# Patient Record
Sex: FEMALE | Race: WHITE | NOT HISPANIC OR LATINO | Employment: FULL TIME | URBAN - METROPOLITAN AREA
[De-identification: names, ages, dates, MRNs, and addresses within clinical notes are randomized per-mention and may not be internally consistent; named-entity substitution may affect disease eponyms.]

---

## 2022-03-28 ENCOUNTER — OFFICE VISIT (OUTPATIENT)
Dept: FAMILY MEDICINE | Facility: CLINIC | Age: 32
End: 2022-03-28
Payer: COMMERCIAL

## 2022-03-28 VITALS
WEIGHT: 115 LBS | OXYGEN SATURATION: 100 % | DIASTOLIC BLOOD PRESSURE: 70 MMHG | TEMPERATURE: 98.8 F | HEART RATE: 70 BPM | RESPIRATION RATE: 16 BRPM | BODY MASS INDEX: 21.16 KG/M2 | HEIGHT: 62 IN | SYSTOLIC BLOOD PRESSURE: 110 MMHG

## 2022-03-28 DIAGNOSIS — R05.9 COUGH: Primary | ICD-10-CM

## 2022-03-28 PROCEDURE — 99203 OFFICE O/P NEW LOW 30 MIN: CPT | Performed by: FAMILY MEDICINE

## 2022-03-28 RX ORDER — PREDNISONE 20 MG/1
TABLET ORAL
Qty: 16 TABLET | Refills: 0 | Status: SHIPPED | OUTPATIENT
Start: 2022-03-28 | End: 2023-04-28

## 2022-03-28 RX ORDER — ALBUTEROL SULFATE 90 UG/1
2 AEROSOL, METERED RESPIRATORY (INHALATION) EVERY 6 HOURS
Qty: 18 G | Refills: 11 | Status: SHIPPED | OUTPATIENT
Start: 2022-03-28 | End: 2023-04-28

## 2022-03-28 RX ORDER — MULTIPLE VITAMINS W/ MINERALS TAB 9MG-400MCG
1 TAB ORAL DAILY
COMMUNITY

## 2022-03-28 ASSESSMENT — PAIN SCALES - GENERAL: PAINLEVEL: NO PAIN (0)

## 2022-03-28 NOTE — PROGRESS NOTES
ASSESSMENT:   (R05.9) Cough  (primary encounter diagnosis)  Comment: frequent respiratory infections.  Lingering cough.  Past history of asthma.    Plan: predniSONE (DELTASONE) 20 MG tablet, albuterol         (PROAIR HFA/PROVENTIL HFA/VENTOLIN HFA) 108 (90        Base) MCG/ACT inhaler          Take prednisone 20mg 2 pills daily for 6 days, then 1 pill daily for another 4 days.   Albuteral inhaler can be used taking 2 inhalations every 4 hours as needed for coughing, wheezing or shortness of breath.     Sometimes there is irritation and inflammation in the airways following a viral upper respiratory infection and the cough can linger for longer than the usual 10 days, sometimes for several weeks.    Increased fluids and humidity in the household, particularly the bedroom can help.   Some of the older antihistamine medications like chlorpheneramine, brompheneramine or clemastine (Tavist) may help for the cough.  Sometimes these are combined with a decongestant like pseudoephedrine.  These over the counter medications are available without a prescription.   Cough suppressant medications like Guaifenesin (Robitussin) with dextromethorphan (DM) can help for cough.  This over the counter medication is available without a prescription.   Codeine with guaifenesin is another option which is prescription and could cause drowsiness but it is good for at night.  Both cough medications are 4 times a day as needed.     Recheck or call with breathing problems, if a fever develops, or getting worse over time.        Ann Berger is a 31 year old who presents for the following health issues   Chief Complaint   Patient presents with     URI      Just moved back to [place] from [place] a year ago.    Was in [place] originally.     Current illness about 2 weeks. Some bronchitis a month ago for 1 and 1/2 weeks a month ago, then better for a week, then ill again.    Had COVID-19 virus in November.     Sinusitis in January.     Had asthma  "in childhood and allergies.   Seemed to grow out of it.    She has had an albuteral inhaler to use off and on.  Has not made much of a difference.     Currently, cough starting to clear.  Some pain left side from cough.  Bothers with turning and tender to touch.   Breathing has been oK.   More cough in AM.   Sinuses seem clear.   No fever in the past couple weeks.    She has had COVID-19 virus vaccine  No booster yet.   nO nasal symptoms at this time.     No chronic illnesses.     Acute Illness  Acute illness concerns: cold symptoms  Onset/Duration: 2 weeks  Symptoms:  Fever: no  Chills/Sweats: no  Headache (location?): no  Sinus Pressure: no  Conjunctivitis:  no  Ear Pain: no  Rhinorrhea: YES prior  Congestion: YES  Sore Throat: no  Cough: YES  Wheeze: YES  Decreased Appetite: no  Nausea: no  Vomiting: no  Diarrhea: no  Dysuria/Freq.: no  Dysuria or Hematuria: no  Fatigue/Achiness: YES  Sick/Strep Exposure:     There is no problem list on file for this patient.     OBJECTIVE:Blood pressure 110/70, pulse 70, temperature 98.8  F (37.1  C), temperature source Tympanic, resp. rate 16, height 1.575 m (5' 2\"), weight 52.2 kg (115 lb), last menstrual period 03/21/2022, SpO2 100 %, not currently breastfeeding. BMI=Body mass index is 21.03 kg/m .  GENERAL APPEARANCE ADULT: Alert, no acute distress  EYES: PERRL, EOM normal, conjunctiva and lids normal  HENT: Ears and TMs normal, oral mucosa and posterior oropharynx normal  NECK: No adenopathy,masses or thyromegaly  RESP: lungs clear to auscultation   CV: normal rate, regular rhythm, no murmur or gallop     Answers for HPI/ROS submitted by the patient on 3/28/2022  How many servings of fruits and vegetables do you eat daily?: 0-1  On average, how many sweetened beverages do you drink each day (Examples: soda, juice, sweet tea, etc.  Do NOT count diet or artificially sweetened beverages)?: 0  How many minutes a day do you exercise enough to make your heart beat faster?: 10 to " 19  How many days a week do you exercise enough to make your heart beat faster?: 3 or less  What is the reason for your visit today?: Re occurring colds  When did your symptoms begin?: 1-2 weeks ago  What are your symptoms?: Cough, side pain  How would you describe these symptoms?: Moderate  Are your symptoms:: Improving  Have you had these symptoms before?: Yes  Have you tried or received treatment for these symptoms before?: Yes  Did that treatment work? : No

## 2022-03-28 NOTE — NURSING NOTE
"Chief Complaint   Patient presents with     URI       Initial /70   Pulse 70   Temp 98.8  F (37.1  C) (Tympanic)   Resp 16   Ht 1.575 m (5' 2\")   Wt 52.2 kg (115 lb)   LMP 03/21/2022   SpO2 100%   Breastfeeding No   BMI 21.03 kg/m   Estimated body mass index is 21.03 kg/m  as calculated from the following:    Height as of this encounter: 1.575 m (5' 2\").    Weight as of this encounter: 52.2 kg (115 lb).    Patient presents to the clinic using     Health Maintenance that is potentially due pending provider review:          Is there anyone who you would like to be able to receive your results?   If yes have patient fill out CALLIE    "

## 2022-03-28 NOTE — PATIENT INSTRUCTIONS
ASSESSMENT:   (R05.9) Cough  (primary encounter diagnosis)  Comment: frequent respiratory infections.  Lingering cough.  Past history of asthma.    Plan: predniSONE (DELTASONE) 20 MG tablet, albuterol         (PROAIR HFA/PROVENTIL HFA/VENTOLIN HFA) 108 (90        Base) MCG/ACT inhaler          Take prednisone 20mg 2 pills daily for 6 days, then 1 pill daily for another 4 days.   Albuteral inhaler can be used taking 2 inhalations every 4 hours as needed for coughing, wheezing or shortness of breath.     Sometimes there is irritation and inflammation in the airways following a viral upper respiratory infection and the cough can linger for longer than the usual 10 days, sometimes for several weeks.    Increased fluids and humidity in the household, particularly the bedroom can help.   Some of the older antihistamine medications like chlorpheneramine, brompheneramine or clemastine (Tavist) may help for the cough.  Sometimes these are combined with a decongestant like pseudoephedrine.  These over the counter medications are available without a prescription.   Cough suppressant medications like Guaifenesin (Robitussin) with dextromethorphan (DM) can help for cough.  This over the counter medication is available without a prescription.   Codeine with guaifenesin is another option which is prescription and could cause drowsiness but it is good for at night.  Both cough medications are 4 times a day as needed.     Recheck or call with breathing problems, if a fever develops, or getting worse over time.

## 2022-04-03 ENCOUNTER — HEALTH MAINTENANCE LETTER (OUTPATIENT)
Age: 32
End: 2022-04-03

## 2022-06-13 ENCOUNTER — TELEPHONE (OUTPATIENT)
Dept: FAMILY MEDICINE | Facility: CLINIC | Age: 32
End: 2022-06-13
Payer: COMMERCIAL

## 2022-06-13 NOTE — TELEPHONE ENCOUNTER
Patient Quality Outreach    Patient is due for the following:   Cervical Cancer Screening - PAP Needed  Physical  - overdue  Immunizations  -  Tdap    NEXT STEPS:   Schedule a yearly physical    Type of outreach:    Sent [employer] message.      Questions for provider review:    None     Celia Donnelly

## 2022-07-29 ENCOUNTER — OFFICE VISIT (OUTPATIENT)
Dept: OBGYN | Facility: CLINIC | Age: 32
End: 2022-07-29
Payer: COMMERCIAL

## 2022-07-29 VITALS
WEIGHT: 121.4 LBS | RESPIRATION RATE: 12 BRPM | TEMPERATURE: 98.1 F | DIASTOLIC BLOOD PRESSURE: 61 MMHG | HEART RATE: 66 BPM | HEIGHT: 62 IN | BODY MASS INDEX: 22.34 KG/M2 | SYSTOLIC BLOOD PRESSURE: 99 MMHG

## 2022-07-29 DIAGNOSIS — N92.0 MENORRHAGIA WITH REGULAR CYCLE: ICD-10-CM

## 2022-07-29 DIAGNOSIS — L70.9 ACNE, UNSPECIFIED ACNE TYPE: Primary | ICD-10-CM

## 2022-07-29 PROCEDURE — 99203 OFFICE O/P NEW LOW 30 MIN: CPT | Performed by: OBSTETRICS & GYNECOLOGY

## 2022-07-29 RX ORDER — CLINDAMYCIN PHOSPHATE 10 UG/ML
LOTION TOPICAL
COMMUNITY
Start: 2022-06-16

## 2022-07-29 RX ORDER — TRETINOIN 0.25 MG/G
CREAM TOPICAL
COMMUNITY
Start: 2022-06-16

## 2022-07-29 RX ORDER — ETONOGESTREL AND ETHINYL ESTRADIOL VAGINAL RING .015; .12 MG/D; MG/D
1 RING VAGINAL
Qty: 3 EACH | Refills: 3 | Status: SHIPPED | OUTPATIENT
Start: 2022-07-29 | End: 2023-04-28

## 2022-07-29 NOTE — PROGRESS NOTES
[facility]    Gynecology Office Note    CC:   Chief Complaint   Patient presents with     Consult        HPI: Celine Potts is a 32 year old  who presents with concern for hormone imbalance. Patient reports having changing hormonal symptoms since 2021. She had her last child 2020 via C section with tubal ligation. No menses until 2021. Since then, notes heavy menstrual bleeding. Menses are regular in interval and last 4 days. First few days are subjectively heavy, using a super tampon every 2 hours. Having associated cramping. Has also noticed an increase in facial acne and some thickening of the hair on her face (not dark). She has recently starting seeing dermatology for the acne and notes some improvement.     GYN Hx:     Patient's last menstrual period was 2022 (exact date).    Cycle: regular  Duration: 4 days  Dysmenorrhea: yes, cramping  Contraception: tubal ligation     Was on OCPs for many years    ROS: A 10 pt ROS was completed and found to be otherwise negative unless mentioned in the HPI.     PMH:   History reviewed. No pertinent past medical history.    PSHx:   Past Surgical History:   Procedure Laterality Date      SECTION Right 2020    with tubal ligation       OBHx:       Medications:   clindamycin (CLEOCIN T) 1 % external lotion,   multivitamin w/minerals (THERA-VIT-M) tablet, Take 1 tablet by mouth daily  tretinoin (RETIN-A) 0.025 % external cream,   albuterol (PROAIR HFA/PROVENTIL HFA/VENTOLIN HFA) 108 (90 Base) MCG/ACT inhaler, Inhale 2 puffs into the lungs every 6 hours (Patient not taking: Reported on 2022)  predniSONE (DELTASONE) 20 MG tablet, Take prednisone 20mg 2 pills daily for 6 days, then 1 pill daily for another 4 days. (Patient not taking: Reported on 2022)    No current facility-administered medications on file prior to visit.      Allergies:    No Known Allergies    Social History:   Social History  "    Socioeconomic History     Marital status:      Spouse name: Not on file     Number of children: Not on file     Years of education: Not on file     Highest education level: Not on file   Occupational History     Not on file   Tobacco Use     Smoking status: Never Smoker     Smokeless tobacco: Never Used   Substance and Sexual Activity     Alcohol use: Not on file     Drug use: Not on file     Sexual activity: Yes     Partners: Male   Other Topics Concern     Not on file   Social History Narrative     Not on file     Social Determinants of Health     Financial Resource Strain: Not on file   Food Insecurity: Not on file   Transportation Needs: Not on file   Physical Activity: Not on file   Stress: Not on file   Social Connections: Not on file   Intimate Partner Violence: Not on file   Housing Stability: Not on file         Family History:   History reviewed. No pertinent family history.    Physical Exam:   Vitals:    07/29/22 0939   BP: 99/61   BP Location: Right arm   Patient Position: Sitting   Cuff Size: Adult Regular   Pulse: 66   Resp: 12   Temp: 98.1  F (36.7  C)   TempSrc: Tympanic   Weight: 55.1 kg (121 lb 6.4 oz)   Height: 1.575 m (5' 2\")      Estimated body mass index is 22.2 kg/m  as calculated from the following:    Height as of this encounter: 1.575 m (5' 2\").    Weight as of this encounter: 55.1 kg (121 lb 6.4 oz).    General appearance: well-hydrated, A&O x 3, no apparent distress  Lungs: Equal expansion bilaterally, no accessory muscle use  Heart: No heaves or thrills.   Constitutional: See vitals  Neuro: CN II-XII grossly intact      Assessment and Plan:     Encounter Diagnoses   Name Primary?     Acne, unspecified acne type Yes     Menorrhagia with regular cycle      Patient with hormonal concerns today, wondering if she needs her hormones tested. Discussed difficulty with hormone testing as they fluctuate so frequently. Discussed possible treatment options for her bothersome symptoms. " Does report her acne regimen from dermatology has shown some improvement. Discussed Spironolactone if needed to help with acne and hair growth. Discussed hormone therapies for additional regulation of this as well as menstrual cycle. She has been on OCPs for years prior to having children and doesn't want to take a pill everyday. After discussion of alternative options, would like to try Nuvaring, Rx sent.    Health maintenance: Due for annual exam and pap smear. Plan 2-3 month follow up for this with recheck of symptoms.     Chelsie Wang DO

## 2022-07-29 NOTE — NURSING NOTE
"Initial BP 99/61 (BP Location: Right arm, Patient Position: Sitting, Cuff Size: Adult Regular)   Pulse 66   Temp 98.1  F (36.7  C) (Tympanic)   Resp 12   Ht 1.575 m (5' 2\")   Wt 55.1 kg (121 lb 6.4 oz)   LMP 07/06/2022 (Exact Date)   BMI 22.20 kg/m   Estimated body mass index is 22.2 kg/m  as calculated from the following:    Height as of this encounter: 1.575 m (5' 2\").    Weight as of this encounter: 55.1 kg (121 lb 6.4 oz). .      "

## 2022-10-03 ENCOUNTER — HEALTH MAINTENANCE LETTER (OUTPATIENT)
Age: 32
End: 2022-10-03

## 2023-03-20 ENCOUNTER — TELEPHONE (OUTPATIENT)
Dept: FAMILY MEDICINE | Facility: CLINIC | Age: 33
End: 2023-03-20
Payer: COMMERCIAL

## 2023-03-20 NOTE — TELEPHONE ENCOUNTER
Patient Quality Outreach    Patient is due for the following:   Physical Preventive Adult Physical    Next Steps:   Physical    Type of outreach:    Sent [employer] message.      Questions for provider review:    None     Ada Fernandez CMA

## 2023-04-05 ENCOUNTER — TELEPHONE (OUTPATIENT)
Dept: OBGYN | Facility: CLINIC | Age: 33
End: 2023-04-05
Payer: COMMERCIAL

## 2023-04-05 NOTE — TELEPHONE ENCOUNTER
Panel Management Review    Health Maintenance List    Health Maintenance   Topic Date Due     YEARLY PREVENTIVE VISIT  Never done     ADVANCE CARE PLANNING  Never done     COVID-19 Vaccine (1) Never done     HIV SCREENING  Never done     HEPATITIS C SCREENING  Never done     PAP  Never done     DTAP/TDAP/TD IMMUNIZATION (3 - Td or Tdap) 2016     INFLUENZA VACCINE (1) 2022     PHQ-2 (once per calendar year)  2023     MENINGITIS IMMUNIZATION  Completed     HEPATITIS B IMMUNIZATION  Completed     Pneumococcal Vaccine: Pediatrics (0 to 5 Years) and At-Risk Patients (6 to 64 Years)  Aged Out     IPV IMMUNIZATION  Aged Out       Composite cancer screening  Chart review shows that this patient is due/due soon for the following Pap Smear  No results found for: PAP  Past Surgical History:   Procedure Laterality Date      SECTION Right 2020    with tubal ligation       Is hysterectomy listed in surgical history? No   Is mastectomy listed in surgical history? No     Summary:    Patient is due/failing the following:   Pap Smear    Action needed: Patient needs office visit for Pap smear.    Type of outreach:  Sent [employer] message.      Staff Signature:  Idris SAM

## 2023-04-08 ENCOUNTER — APPOINTMENT (OUTPATIENT)
Dept: MRI IMAGING | Facility: CLINIC | Age: 33
End: 2023-04-08
Attending: FAMILY MEDICINE
Payer: COMMERCIAL

## 2023-04-08 ENCOUNTER — NURSE TRIAGE (OUTPATIENT)
Dept: NURSING | Facility: CLINIC | Age: 33
End: 2023-04-08
Payer: COMMERCIAL

## 2023-04-08 ENCOUNTER — HOSPITAL ENCOUNTER (EMERGENCY)
Facility: CLINIC | Age: 33
Discharge: HOME | End: 2023-04-08
Attending: FAMILY MEDICINE | Admitting: FAMILY MEDICINE
Payer: COMMERCIAL

## 2023-04-08 VITALS
HEART RATE: 94 BPM | SYSTOLIC BLOOD PRESSURE: 112 MMHG | RESPIRATION RATE: 18 BRPM | WEIGHT: 120 LBS | TEMPERATURE: 98 F | HEIGHT: 62 IN | OXYGEN SATURATION: 100 % | BODY MASS INDEX: 22.08 KG/M2 | DIASTOLIC BLOOD PRESSURE: 63 MMHG

## 2023-04-08 DIAGNOSIS — R11.2 NAUSEA AND VOMITING, UNSPECIFIED VOMITING TYPE: ICD-10-CM

## 2023-04-08 DIAGNOSIS — R42 DIZZINESS: ICD-10-CM

## 2023-04-08 LAB
ANION GAP SERPL CALCULATED.3IONS-SCNC: 12 MMOL/L (ref 7–15)
BASOPHILS # BLD AUTO: 0 10E3/UL (ref 0–0.2)
BASOPHILS NFR BLD AUTO: 0 %
BUN SERPL-MCNC: 9.2 MG/DL (ref 6–20)
CALCIUM SERPL-MCNC: 9.6 MG/DL (ref 8.6–10)
CHLORIDE SERPL-SCNC: 101 MMOL/L (ref 98–107)
CREAT SERPL-MCNC: 0.71 MG/DL (ref 0.51–0.95)
DEPRECATED HCO3 PLAS-SCNC: 22 MMOL/L (ref 22–29)
EOSINOPHIL # BLD AUTO: 0 10E3/UL (ref 0–0.7)
EOSINOPHIL NFR BLD AUTO: 0 %
ERYTHROCYTE [DISTWIDTH] IN BLOOD BY AUTOMATED COUNT: 11.8 % (ref 10–15)
GFR SERPL CREATININE-BSD FRML MDRD: >90 ML/MIN/1.73M2
GLUCOSE SERPL-MCNC: 94 MG/DL (ref 70–99)
HCT VFR BLD AUTO: 43.1 % (ref 35–47)
HGB BLD-MCNC: 14.8 G/DL (ref 11.7–15.7)
HOLD SPECIMEN: NORMAL
IMM GRANULOCYTES # BLD: 0 10E3/UL
IMM GRANULOCYTES NFR BLD: 0 %
LYMPHOCYTES # BLD AUTO: 1.2 10E3/UL (ref 0.8–5.3)
LYMPHOCYTES NFR BLD AUTO: 24 %
MCH RBC QN AUTO: 30.7 PG (ref 26.5–33)
MCHC RBC AUTO-ENTMCNC: 34.3 G/DL (ref 31.5–36.5)
MCV RBC AUTO: 89 FL (ref 78–100)
MONOCYTES # BLD AUTO: 0.3 10E3/UL (ref 0–1.3)
MONOCYTES NFR BLD AUTO: 6 %
NEUTROPHILS # BLD AUTO: 3.5 10E3/UL (ref 1.6–8.3)
NEUTROPHILS NFR BLD AUTO: 70 %
NRBC # BLD AUTO: 0 10E3/UL
NRBC BLD AUTO-RTO: 0 /100
PLATELET # BLD AUTO: 207 10E3/UL (ref 150–450)
POTASSIUM SERPL-SCNC: 4.1 MMOL/L (ref 3.4–5.3)
RBC # BLD AUTO: 4.82 10E6/UL (ref 3.8–5.2)
SODIUM SERPL-SCNC: 135 MMOL/L (ref 136–145)
WBC # BLD AUTO: 5 10E3/UL (ref 4–11)

## 2023-04-08 PROCEDURE — 258N000003 HC RX IP 258 OP 636: Performed by: FAMILY MEDICINE

## 2023-04-08 PROCEDURE — 96374 THER/PROPH/DIAG INJ IV PUSH: CPT | Mod: 59 | Performed by: FAMILY MEDICINE

## 2023-04-08 PROCEDURE — 250N000011 HC RX IP 250 OP 636: Performed by: FAMILY MEDICINE

## 2023-04-08 PROCEDURE — 85025 COMPLETE CBC W/AUTO DIFF WBC: CPT | Performed by: FAMILY MEDICINE

## 2023-04-08 PROCEDURE — 255N000002 HC RX 255 OP 636: Performed by: FAMILY MEDICINE

## 2023-04-08 PROCEDURE — 99284 EMERGENCY DEPT VISIT MOD MDM: CPT | Mod: 25 | Performed by: FAMILY MEDICINE

## 2023-04-08 PROCEDURE — 99285 EMERGENCY DEPT VISIT HI MDM: CPT | Mod: 25 | Performed by: FAMILY MEDICINE

## 2023-04-08 PROCEDURE — 96361 HYDRATE IV INFUSION ADD-ON: CPT | Performed by: FAMILY MEDICINE

## 2023-04-08 PROCEDURE — 96375 TX/PRO/DX INJ NEW DRUG ADDON: CPT | Mod: 59 | Performed by: FAMILY MEDICINE

## 2023-04-08 PROCEDURE — 70553 MRI BRAIN STEM W/O & W/DYE: CPT

## 2023-04-08 PROCEDURE — 80048 BASIC METABOLIC PNL TOTAL CA: CPT | Performed by: FAMILY MEDICINE

## 2023-04-08 PROCEDURE — 36415 COLL VENOUS BLD VENIPUNCTURE: CPT | Performed by: FAMILY MEDICINE

## 2023-04-08 PROCEDURE — 93005 ELECTROCARDIOGRAM TRACING: CPT | Performed by: FAMILY MEDICINE

## 2023-04-08 PROCEDURE — A9585 GADOBUTROL INJECTION: HCPCS | Performed by: FAMILY MEDICINE

## 2023-04-08 PROCEDURE — 250N000013 HC RX MED GY IP 250 OP 250 PS 637: Performed by: FAMILY MEDICINE

## 2023-04-08 PROCEDURE — 93010 ELECTROCARDIOGRAM REPORT: CPT | Performed by: FAMILY MEDICINE

## 2023-04-08 RX ORDER — ONDANSETRON 2 MG/ML
4 INJECTION INTRAMUSCULAR; INTRAVENOUS ONCE
Status: DISCONTINUED | OUTPATIENT
Start: 2023-04-08 | End: 2023-04-08 | Stop reason: HOSPADM

## 2023-04-08 RX ORDER — LORAZEPAM 2 MG/ML
1 INJECTION INTRAMUSCULAR ONCE
Status: COMPLETED | OUTPATIENT
Start: 2023-04-08 | End: 2023-04-08

## 2023-04-08 RX ORDER — PROMETHAZINE HYDROCHLORIDE 25 MG/1
25 TABLET ORAL ONCE
Status: COMPLETED | OUTPATIENT
Start: 2023-04-08 | End: 2023-04-08

## 2023-04-08 RX ORDER — PROMETHAZINE HYDROCHLORIDE 25 MG/1
25 TABLET ORAL EVERY 6 HOURS PRN
Qty: 16 TABLET | Refills: 0 | Status: SHIPPED | OUTPATIENT
Start: 2023-04-08 | End: 2023-04-28

## 2023-04-08 RX ORDER — MECLIZINE HYDROCHLORIDE 25 MG/1
25 TABLET ORAL 3 TIMES DAILY PRN
Qty: 12 TABLET | Refills: 0 | Status: SHIPPED | OUTPATIENT
Start: 2023-04-08 | End: 2023-04-28

## 2023-04-08 RX ORDER — SODIUM CHLORIDE 9 MG/ML
1000 INJECTION, SOLUTION INTRAVENOUS CONTINUOUS
Status: DISCONTINUED | OUTPATIENT
Start: 2023-04-08 | End: 2023-04-08 | Stop reason: HOSPADM

## 2023-04-08 RX ORDER — ONDANSETRON 4 MG/1
4 TABLET, ORALLY DISINTEGRATING ORAL ONCE
Status: COMPLETED | OUTPATIENT
Start: 2023-04-08 | End: 2023-04-08

## 2023-04-08 RX ORDER — ONDANSETRON 4 MG/1
4-8 TABLET, ORALLY DISINTEGRATING ORAL EVERY 6 HOURS PRN
Qty: 10 TABLET | Refills: 0 | Status: SHIPPED | OUTPATIENT
Start: 2023-04-08

## 2023-04-08 RX ORDER — GADOBUTROL 604.72 MG/ML
5 INJECTION INTRAVENOUS ONCE
Status: COMPLETED | OUTPATIENT
Start: 2023-04-08 | End: 2023-04-08

## 2023-04-08 RX ORDER — ONDANSETRON 2 MG/ML
4 INJECTION INTRAMUSCULAR; INTRAVENOUS
Status: COMPLETED | OUTPATIENT
Start: 2023-04-08 | End: 2023-04-08

## 2023-04-08 RX ORDER — MECLIZINE HYDROCHLORIDE 25 MG/1
25 TABLET ORAL ONCE
Status: COMPLETED | OUTPATIENT
Start: 2023-04-08 | End: 2023-04-08

## 2023-04-08 RX ADMIN — LORAZEPAM 1 MG: 2 INJECTION INTRAMUSCULAR; INTRAVENOUS at 13:44

## 2023-04-08 RX ADMIN — MECLIZINE HYDROCHLORIDE 25 MG: 25 TABLET ORAL at 18:28

## 2023-04-08 RX ADMIN — SODIUM CHLORIDE 1000 ML: 9 INJECTION, SOLUTION INTRAVENOUS at 13:43

## 2023-04-08 RX ADMIN — PROMETHAZINE HYDROCHLORIDE 25 MG: 25 TABLET ORAL at 18:29

## 2023-04-08 RX ADMIN — ONDANSETRON 4 MG: 2 INJECTION INTRAMUSCULAR; INTRAVENOUS at 13:22

## 2023-04-08 RX ADMIN — ONDANSETRON 4 MG: 4 TABLET, ORALLY DISINTEGRATING ORAL at 18:28

## 2023-04-08 RX ADMIN — GADOBUTROL 5 ML: 604.72 INJECTION INTRAVENOUS at 15:44

## 2023-04-08 ASSESSMENT — ENCOUNTER SYMPTOMS
WHEEZING: 0
FREQUENCY: 0
DYSURIA: 0
DIARRHEA: 0
NAUSEA: 1
COUGH: 0
SORE THROAT: 0
CHILLS: 0
FEVER: 0
PALPITATIONS: 0
SINUS PRESSURE: 0
BLOOD IN STOOL: 0
HEADACHES: 0
CONSTIPATION: 0
DIAPHORESIS: 0
VOMITING: 1
ABDOMINAL PAIN: 0
DIZZINESS: 1
SHORTNESS OF BREATH: 0

## 2023-04-08 ASSESSMENT — ACTIVITIES OF DAILY LIVING (ADL)
ADLS_ACUITY_SCORE: 35
ADLS_ACUITY_SCORE: 35
ADLS_ACUITY_SCORE: 33
ADLS_ACUITY_SCORE: 35

## 2023-04-08 NOTE — ED TRIAGE NOTES
Vertigo since Thursday.  Pt went to work on Friday and then became dizzy again.   Pt has been vomiting since Friday night, hot/cold sweats.   Pt denies numbness, tingling.       Triage Assessment     Row Name 04/08/23 1241       Triage Assessment (Adult)    Airway WDL WDL       Respiratory WDL    Respiratory WDL WDL

## 2023-04-08 NOTE — ED NOTES
Pt feeling dizzy since Thursday, nausea with emesis.  Denies diarrhea today, but states GI illness last week.  Per MD, patient ataxic.  MRI checklist completed, faxed, pt in MRI gown.  IVF infusing.  Family at bedside.

## 2023-04-08 NOTE — TELEPHONE ENCOUNTER
"Pt calling with concerns about;    Vertigo symptoms began on or about 4/6/23  Dizziness with vomiting  Constant spinning   Unable to stand without support  Unable to keep food down    Pt Denies;  Fever  Diarrhea    According to the protocol, patient should go to ED - no PCP for 2LT.  Care advice given. Patient verbalizes understanding and agrees with plan of care.     Sheila Tavares RN, Nurse Advisor 10:03 AM 4/8/2023  Reason for Disposition    SEVERE dizziness (e.g., unable to stand, requires support to walk, feels like passing out now)    Additional Information    Negative: SEVERE difficulty breathing (e.g., struggling for each breath, speaks in single words)    Negative: [1] Difficulty breathing or swallowing AND [2] started suddenly after medicine, an allergic food or bee sting    Negative: Shock suspected (e.g., cold/pale/clammy skin, too weak to stand, low BP, rapid pulse)    Negative: Difficult to awaken or acting confused (e.g., disoriented, slurred speech)    Negative: [1] Weakness (i.e., paralysis, loss of muscle strength) of the face, arm or leg on one side of the body AND [2] sudden onset AND [3] present now    Negative: [1] Numbness (i.e., loss of sensation) of the face, arm or leg on one side of the body AND [2] sudden onset AND [3] present now    Negative: [1] Loss of speech or garbled speech AND [2] sudden onset AND [3] present now    Negative: Overdose (accidental or intentional) of medications    Negative: [1] Fainted > 15 minutes ago AND [2] still feels too weak or dizzy to stand    Negative: Heart beating < 50 beats per minute OR > 140 beats per minute    Negative: Sounds like a life-threatening emergency to the triager    Negative: Chest pain    Negative: Rectal bleeding, bloody stool, or tarry-black stool    Negative: [1] Vomiting AND [2] contains red blood or black (\"coffee ground\") material    Negative: Vomiting is main symptom    Negative: Diarrhea is main symptom    Negative: Headache is " main symptom    Negative: Patient states that they are having an anxiety or panic attack    Negative: Dizziness from low blood sugar (i.e., < 60 mg/dl or 3.5 mmol/l)    Negative: Dizziness is described as a spinning sensation (i.e., vertigo)    Negative: Heat exhaustion suspected (i.e., dehydration from heat exposure)    Negative: Difficulty breathing    Protocols used: DIZZINESS - [id number]

## 2023-04-08 NOTE — ED PROVIDER NOTES
Emergency Department Patient Sign-out     ED Visit Duration at the Time of Chart Review:  5 hrs 41 min.    ED Summary and Plan at Sign Out:  Patient is thought to have peripheral vertigo.  MRI was ordered and is unremarkable.  Patient was scheduled to be discharged home at this point.    ED MDM:  1818.  Unfortunately, the patient continues to be nauseous and looks quite uncomfortable.  The IV has been removed.  She is holding her emesis bag.  I will provide Phenergan and meclizine by mouth.  She has already had 8 mg of Zofran, a liter of normal saline, and Ativan.  Low concern for central cause of symptoms.  Other viral illness?  She did have some mildly loose stool this morning.  Other family members with stomach related issues earlier in the week.    1919.  Patient improved with medications given.  Referral order for ENT as needed.  Prescriptions for Phenergan, meclizine, and Zofran.  Viral versus peripheral vertigo.    IMPRESSION:    ICD-10-CM    1. Acute vestibular syndrome  H81.90     I do suspect this is ultimately triggered vertigo due to BPPV and would recommend home epley maneuver (see [employer] online Youtube videos),  as needed antivert or dramamine for the first 3 days only.              Kahlil Noriega MD  04/08/23 1919

## 2023-04-08 NOTE — ED PROVIDER NOTES
History     Chief Complaint   Patient presents with     Dizziness     HPI  Celine Potts is a 32 year old female who presents with vertigo that had onset on Thursday in the afternoon.  It appeared to be worse with head turning but was not clearly only with this.  There may have been a continuous vertigo present.  There was nausea vomiting without diarrhea.  She had no ear pain sinus pressure sore throat.  No tinnitus.  No prior vertigo.  History of a more distant severe migraine that was associate with atypical features speech changes that prompted a MRI that was negative.  Is on NuvaRing now.  She denies any head injury.  She has had a prior tubal ligation and the NuvaRing is for menorrhagia.  She has no associated weakness focally.  No change in speech or swallowing.    Allergies:  No Known Allergies    Problem List:    There are no problems to display for this patient.       Past Medical History:    No past medical history on file.    Past Surgical History:    Past Surgical History:   Procedure Laterality Date      SECTION Right 2020    with tubal ligation       Family History:    No family history on file.    Social History:  Marital Status:   [2]  Social History     Tobacco Use     Smoking status: Never     Smokeless tobacco: Never        Medications:    albuterol (PROAIR HFA/PROVENTIL HFA/VENTOLIN HFA) 108 (90 Base) MCG/ACT inhaler  clindamycin (CLEOCIN T) 1 % external lotion  etonogestrel-ethinyl estradiol (NUVARING) 0.12-0.015 MG/24HR vaginal ring  multivitamin w/minerals (THERA-VIT-M) tablet  predniSONE (DELTASONE) 20 MG tablet  tretinoin (RETIN-A) 0.025 % external cream          Review of Systems   Constitutional: Negative for chills, diaphoresis and fever.   HENT: Negative for ear pain, sinus pressure and sore throat.    Eyes: Negative for visual disturbance.   Respiratory: Negative for cough, shortness of breath and wheezing.    Cardiovascular: Negative for chest pain and  "palpitations.   Gastrointestinal: Positive for nausea and vomiting. Negative for abdominal pain, blood in stool, constipation and diarrhea.   Genitourinary: Negative for dysuria, frequency and urgency.   Skin: Negative for rash.   Neurological: Positive for dizziness. Negative for headaches.   All other systems reviewed and are negative.      Physical Exam   BP: 102/71  Pulse: 95  Temp: 98  F (36.7  C)  Resp: 18  Height: 157.5 cm (5' 2\")  Weight: 54.4 kg (120 lb)  SpO2: 99 %      Physical Exam  Constitutional:       General: She is in acute distress.      Appearance: She is not diaphoretic.   HENT:      Head: Atraumatic.      Right Ear: Tympanic membrane normal.      Left Ear: Tympanic membrane normal.   Eyes:      Extraocular Movements: Extraocular movements intact.      Right eye: Nystagmus present.      Left eye: Nystagmus present.      Conjunctiva/sclera: Conjunctivae normal.      Pupils: Pupils are equal, round, and reactive to light.   Cardiovascular:      Rate and Rhythm: Normal rate and regular rhythm.      Heart sounds: No murmur heard.  Pulmonary:      Effort: Pulmonary effort is normal. No respiratory distress.      Breath sounds: Normal breath sounds. No stridor. No wheezing or rhonchi.   Abdominal:      General: Abdomen is flat. There is no distension.      Palpations: Abdomen is soft. There is no mass.      Tenderness: There is no abdominal tenderness. There is no guarding.   Musculoskeletal:      Cervical back: Neck supple.      Right lower leg: No edema.      Left lower leg: No edema.   Skin:     Coloration: Skin is not pale.      Findings: No rash.   Neurological:      General: No focal deficit present.      Mental Status: She is alert and oriented to person, place, and time.      GCS: GCS eye subscore is 4. GCS verbal subscore is 5. GCS motor subscore is 6.      Cranial Nerves: No cranial nerve deficit, dysarthria or facial asymmetry.      Sensory: No sensory deficit.      Motor: No weakness or " tremor.      Coordination: Romberg sign negative. Coordination normal. Finger-Nose-Finger Test normal. Rapid alternating movements normal.      Gait: Gait abnormal.     Nystagmus appears to be most prominent when looking to the right side I said to a lesser extent to the left side.  There is also increased vertigo that appears to be worse when she turns her head to the left side.  The nystagmus was not continuous and therefore difficult to perform hints exam.  I did ambulate the patient.  And she had ataxia with ambulation and had some difficulty with Romberg as well.          ED Course                 Procedures                EKG Interpretation:      Interpreted by Shaw Whyte MD  EKG done at 1455 hrs. demonstrates a sinus rhythm 91 bpm with a normal axis.  No ST change.  No T wave changes.  Normal R progression and no Q waves.  Normal intervals.  Normal conduction.  No ectopy.  Impression sinus rhythm 91 bpm with no significant acute changes.    Critical Care time:  none               Results for orders placed or performed during the hospital encounter of 04/08/23 (from the past 24 hour(s))   [place] Draw    Narrative    The following orders were created for panel order [place] Draw.  Procedure                               Abnormality         Status                     ---------                               -----------         ------                     Extra Red Top Tube[[id number]]                               Final result               Extra Green Top (Lithium...[[id number]]                      Final result               Extra Purple Top Tube[[id number]]                            Final result                 Please view results for these tests on the individual orders.   Extra Red Top Tube   Result Value Ref Range    Hold Specimen JIC    Extra Green Top (Lithium Heparin) Tube   Result Value Ref Range    Hold Specimen JIC    Extra Purple Top Tube   Result Value Ref Range    Hold Specimen JIC    CBC with  platelets differential    Narrative    The following orders were created for panel order CBC with platelets differential.  Procedure                               Abnormality         Status                     ---------                               -----------         ------                     CBC with platelets and d...[[id number]]                      Final result                 Please view results for these tests on the individual orders.   Basic metabolic panel   Result Value Ref Range    Sodium 135 (L) 136 - 145 mmol/L    Potassium 4.1 3.4 - 5.3 mmol/L    Chloride 101 98 - 107 mmol/L    Carbon Dioxide (CO2) 22 22 - 29 mmol/L    Anion Gap 12 7 - 15 mmol/L    Urea Nitrogen 9.2 6.0 - 20.0 mg/dL    Creatinine 0.71 0.51 - 0.95 mg/dL    Calcium 9.6 8.6 - 10.0 mg/dL    Glucose 94 70 - 99 mg/dL    GFR Estimate >90 >60 mL/min/1.73m2   CBC with platelets and differential   Result Value Ref Range    WBC Count 5.0 4.0 - 11.0 10e3/uL    RBC Count 4.82 3.80 - 5.20 10e6/uL    Hemoglobin 14.8 11.7 - 15.7 g/dL    Hematocrit 43.1 35.0 - 47.0 %    MCV 89 78 - 100 fL    MCH 30.7 26.5 - 33.0 pg    MCHC 34.3 31.5 - 36.5 g/dL    RDW 11.8 10.0 - 15.0 %    Platelet Count 207 150 - 450 10e3/uL    % Neutrophils 70 %    % Lymphocytes 24 %    % Monocytes 6 %    % Eosinophils 0 %    % Basophils 0 %    % Immature Granulocytes 0 %    NRBCs per 100 WBC 0 <1 /100    Absolute Neutrophils 3.5 1.6 - 8.3 10e3/uL    Absolute Lymphocytes 1.2 0.8 - 5.3 10e3/uL    Absolute Monocytes 0.3 0.0 - 1.3 10e3/uL    Absolute Eosinophils 0.0 0.0 - 0.7 10e3/uL    Absolute Basophils 0.0 0.0 - 0.2 10e3/uL    Absolute Immature Granulocytes 0.0 <=0.4 10e3/uL    Absolute NRBCs 0.0 10e3/uL   MR Brain w/o & w Contrast    Narrative    EXAM: MR BRAIN W/O and W CONTRAST  LOCATION: [facility]  DATE/TIME: 4/8/2023 4:02 PM    INDICATION: Acute vestibular syndrome. Vertigo with positional worsening x3 days  COMPARISON: None.  CONTRAST: Gadavist 5  mL  TECHNIQUE: Routine multiplanar multisequence head MRI without and with intravenous contrast.    FINDINGS:  INTRACRANIAL CONTENTS: No evidence for acute or subacute infarction based on diffusion-weighted imaging. No mass, acute hemorrhage, or extra-axial fluid collections. Normal brain parenchymal signal. Normal ventricles and sulci. Normal position of the   cerebellar tonsils. No pathologic contrast enhancement.    SELLA: No abnormality accounting for technique.    OSSEOUS STRUCTURES/SOFT TISSUES: Normal marrow signal. The major intracranial vascular flow voids are maintained.     ORBITS: No abnormality accounting for technique.     SINUSES/MASTOIDS: No paranasal sinus mucosal disease. No middle ear or mastoid effusion.         Impression    IMPRESSION:  1.  Normal head MRI.       Medications   sodium chloride 0.9% infusion (has no administration in time range)   ondansetron (ZOFRAN) injection 4 mg (has no administration in time range)   gadobutrol (GADAVIST) injection 5 mL (has no administration in time range)   ondansetron (ZOFRAN) injection 4 mg (4 mg Intravenous $Given 4/8/23 1322)   LORazepam (ATIVAN) injection 1 mg (1 mg Intravenous $Given 4/8/23 1344)   0.9% sodium chloride BOLUS (1,000 mLs Intravenous $New Bag 4/8/23 1343)       Assessments & Plan (with Medical Decision Making)     MDM: Celine Potts is a 32 year old female presents with acute onset vertigo that for the most part appears to be triggered vertigo and most likely consistent with BPPV however she has an ataxia and she did not follow typical pattern with Epley maneuvers here.  Possibly persistent acute vestibular syndrome as opposed to triggered vertigo and also has several risks including the use of estrogen products in the history of an atypical migraine the past with neurologic features that prompted an MRI.   The combination recommended that we pursue MRI.  She agrees.  Was given Ativan initially.  Plan for discharge home if MRI  negative.    signed out to Dr. Noriega    I have reviewed the nursing notes.    I have reviewed the findings, diagnosis, plan and need for follow up with the patient.           Medical Decision Making  The patient's presentation was of moderate complexity (an undiagnosed new problem with uncertain diagnosis).    The patient's evaluation involved:  ordering and/or review of 3+ test(s) in this encounter (see separate area of note for details)    The patient's management necessitated moderate risk (a decision regarding minor procedure (MRI Brain) with risk factors of none).        New Prescriptions    No medications on file       Final diagnoses:   Acute vestibular syndrome - I do suspect this is ultimately triggered vertigo due to BPPV and would recommend home epley maneuver (see Noble and Matty online Youtube videos),  as needed antivert or dramamine for the first 3 days only.        4/8/2023   [facility] EMERGENCY DEPT     Shaw Whyte MD  04/08/23 1039

## 2023-04-08 NOTE — DISCHARGE INSTRUCTIONS
RETURN TO THE EMERGENCY ROOM FOR THE FOLLOWING:    Severely worsened dizziness, vomiting and dehydration, fever greater than 101 and overall worsening condition, or at anytime for any concern.    FOLLOW UP:    Consider ENT follow-up if dizziness is persistent over the next 7 days.  A referral order is placed at the time of your discharge.  Expect a phone call within the next 2-3 business days.    TREATMENT RECOMMENDATIONS:    Meclizine for dizziness,as needed.  Phenergan for nausea and dizziness, as needed.  Zofran for nausea, as needed.    NURSE ADVICE LINE:  [phone] or [phone]

## 2023-04-09 NOTE — ED NOTES
Pt feels improved, discussed discharge instructions, and handed patient prescriptions. Pt d/c instructions reviewed and received. There are no unanswered questions at the time of discharge. Pt escorted to lobby for discharge.

## 2023-04-10 ENCOUNTER — TELEPHONE (OUTPATIENT)
Dept: OTOLARYNGOLOGY | Facility: CLINIC | Age: 33
End: 2023-04-10
Payer: COMMERCIAL

## 2023-04-10 NOTE — TELEPHONE ENCOUNTER
[facility] Call Center    Phone Message    May a detailed message be left on voicemail: yes     Reason for Call: Symptoms or Concerns     If patient has red-flag symptoms, warm transfer to triage line    Current symptom or concern: Patient scheduled 1st available in [place] for dizziness.     She said her symptoms started 4/6 and progressively got worse throughout the weekend so she went into the emergency department.     If she can be seen sooner please reach out, she does have an urgent referral.          Action Taken: Other: ENT    Travel Screening: Not Applicable

## 2023-04-10 NOTE — TELEPHONE ENCOUNTER
Per ED 4/8/2023: Patient improved with medications given.  Referral order for ENT as needed.  Prescriptions for Phenergan, meclizine, and Zofran.  Viral versus peripheral vertigo.    [employer] message sent in regards to scheduling.     Lynette NOWAK RN  [facility]

## 2023-04-11 ENCOUNTER — TELEPHONE (OUTPATIENT)
Dept: FAMILY MEDICINE | Facility: CLINIC | Age: 33
End: 2023-04-11
Payer: COMMERCIAL

## 2023-04-11 DIAGNOSIS — R42 DIZZINESS: Primary | ICD-10-CM

## 2023-04-11 NOTE — TELEPHONE ENCOUNTER
04-08-23 ED visit- nausea, vomiting, dizziness.     IMPRESSION:      ICD-10-CM     1. Acute vestibular syndrome  H81.90       I do suspect this is ultimately triggered vertigo due to BPPV and would recommend home epley maneuver (see Noble and Matty online Youtube videos),  as needed antivert or dramamine for the first 3 days only.          States nausea/ vomiting subsided Sun with phenergan however dizziness persists despite meclizine. Denies worsening dizziness, occurs with distance focusing. Some imbalance. States is comfortable now that nausea/ vomiting has subsided. Cautious with position changes. Able to work from home.   Has scheduled ENT visit, booked out until 8/28.   Has est care appt with Josi Ramos NP, 4/28.  Pt scheduled PT appt for vestibular eval 4/19. Requesting PT referral from Josi.  RN discussed with Josi who approves PT referral for BPPV.- VORB.  Pt informed. States has requested to be on PT cancel list.  Also if wanting to pursue sooner ENT appt may check with insurance for other ENT options in network.   To contact care team if further questions, concerns.  CHEKO Valerio RN

## 2023-04-11 NOTE — TELEPHONE ENCOUNTER
Symptoms    Describe your symptoms: Dizziness, trouble focus on something further away, instability walking  Pt was in the ER 4/8/23   Pt does have appt scheduled 4/28/23 Josi BRADFORD to establish care.  Pt is wondering if she could get a Referral for PT     How long have you been having symptoms: 4/6/23      Have you been seen for this:  Yes: 4/8/23         Preferred Pharmacy:   [facility]           Could we send this information to you in [facility] or would you prefer to receive a phone call?:   Patient would prefer a phone call   Okay to leave a detailed message?: Yes at Home number on file [phone] (home)    [facility]

## 2023-04-12 ENCOUNTER — HOSPITAL ENCOUNTER (OUTPATIENT)
Dept: PHYSICAL THERAPY | Facility: CLINIC | Age: 33
Setting detail: THERAPIES SERIES
Discharge: HOME | End: 2023-04-12
Attending: NURSE PRACTITIONER
Payer: COMMERCIAL

## 2023-04-12 PROCEDURE — 97161 PT EVAL LOW COMPLEX 20 MIN: CPT | Mod: GP | Performed by: PHYSICAL THERAPIST

## 2023-04-12 PROCEDURE — 95992 CANALITH REPOSITIONING PROC: CPT | Mod: GP | Performed by: PHYSICAL THERAPIST

## 2023-04-12 NOTE — PROGRESS NOTES
Physical Therapy Evaluation  04/12/23 0800   Quick Adds   Quick Adds Vestibular Eval   Type of Visit Initial OP PT Evaluation   General Information   Start of Care Date 04/12/23   Referring Physician Josi Ramos CNP   Orders Evaluate and Treat as Indicated   Order Date 04/11/23   Medical Diagnosis Dizziness R42   Onset of illness/injury or Date of Surgery 04/06/23   Precautions/Limitations no known precautions/limitations   Surgical/Medical history reviewed Yes   Pertinent history of current problem (include personal factors and/or comorbidities that impact the POC) Pt reports that her onset of dizziness was Thursday 4/6/2023.   Patient role/Employment history Employed   Living environment [place]   Home/Community Accessibility Comments [place]   Patient/Family Goals Statement decrease dizziness and to be able to drive   Fall Risk Screen   Fall screen completed by PT   Have you fallen 2 or more times in the past year? No   Have you fallen and had an injury in the past year? No   Is patient a fall risk? No   Abuse Screen (yes response referral indicated)   Feels Unsafe at Home or Work/School no   Feels Threatened by Someone no   Does Anyone Try to Keep You From Having Contact with Others or Doing Things Outside Your Home? no   Physical Signs of Abuse Present no   System Outcome Measures   Outcome Measures BPPV   Dizziness Handicap Inventory (score out of 100) A decrease in score by 17.18 or greater indicates a clinically significant change in symptoms. 92   Pain   Patient currently in pain No   Range of Motion (ROM)   ROM Quick Adds no deficits were identified   Strength   Manual Muscle Testing Quick Adds No deficits were identified   Balance   Balance Comments Not assessed   Cervicogenic Screen   Neck ROM Full ROM   Vertebral Artery Test Normal   Alar Ligament Test Normal   Transverse Ligament Test Normal   Oculomotor Exam   Smooth Pursuit Normal   Saccades Comments Will assess at future visit due to  reduced time at eval   VOR Comments Will assess at future visit due to reduced time at eval   VOR Cancellation Comments Will assess at future visit due to reduced time at eval   Rapid Head Thrust Comments Will assess at future visit due to reduced time at eval   Convergence Testing Comments Will assess at future visit due to reduced time at eval   Infrared Goggle Exam or Frenzel Lense Exam   Vestibular Suppressant in Last 24 Hours? No   Spontaneous Nystagmus Negative   Monet-Hallpike (right) Other   [place]-Hallpike (right) comments Nystagmus present, however unable to determine direction; Nystagmus lasting longer than 1 minute   [place]-Hallpike (Left) Upbeating L torsional   HSCC Supine Roll Test (Right) Negative   HSCC Supine Roll Test (Left) Horizontal L   HSCC Supine Roll Test (Left) Comments  Duration longer than 1 minute   BPPV Canal(s) L Posterior;L Horizontal   BPPV Type Cupulolithasis   Dynamic Visual Acuity (DVA)   DVA Comments Will assess at future visit due to reduced time at eval   Planned Therapy Interventions   Planned Therapy Interventions balance training;gait training;joint mobilization;neuromuscular re-education;ROM;strengthening;stretching;manual therapy   Planned Therapy Interventions Comment SCR   Clinical Impression   Criteria for Skilled Therapeutic Interventions Met yes, treatment indicated   PT Diagnosis BPPV: L posterior and L horizontal cupulolithasis   Influenced by the following impairments (+) monet hallpike, (+) roll test, dizziness   Functional limitations due to impairments Limited ambulation, driving, bed mobility   Clinical Presentation Evolving/Changing   Clinical Presentation Rationale severity of symptoms   Clinical Decision Making (Complexity) Low complexity   Therapy Frequency 2 times/Week   Predicted Duration of Therapy Intervention (days/wks) 8 weeks   Risk & Benefits of therapy have been explained Yes   Patient, Family & other staff in agreement with plan of care Yes   GOALS   PT  Eval Goals 1;2;3   Goal 1   Goal Identifier felicitas hallpike   Goal Description Pt will have negative testing with felicitas hallpike in order to have improved bed mobility.   Target Date 05/24/23   Goal 2   Goal Identifier roll test   Goal Description Pt will demonstrate negative testing with roll test in order to have reduced symptoms with rolling in bed.   Target Date 06/07/23   Goal 3   Goal Identifier Driving   Goal Description Pt will reports 75% reduction in symptoms in order to tolerate driving.   Target Date 06/07/23   Total Evaluation Time   PT Eval, Low Complexity Minutes (63443) 20     Please contact me with any questions or concerns.    Thank you for your referral,     Simona Zaragoza, PT, DPT, CLT  Physical Therapist & Certified Lymphedema Therapist  [facility]

## 2023-04-18 ENCOUNTER — HOSPITAL ENCOUNTER (OUTPATIENT)
Dept: PHYSICAL THERAPY | Facility: CLINIC | Age: 33
Setting detail: THERAPIES SERIES
Discharge: HOME | End: 2023-04-18
Attending: REGISTERED NURSE
Payer: COMMERCIAL

## 2023-04-18 PROCEDURE — 95992 CANALITH REPOSITIONING PROC: CPT | Mod: GP | Performed by: PHYSICAL THERAPIST

## 2023-04-25 ENCOUNTER — HOSPITAL ENCOUNTER (OUTPATIENT)
Dept: PHYSICAL THERAPY | Facility: CLINIC | Age: 33
Setting detail: THERAPIES SERIES
Discharge: HOME | End: 2023-04-25
Attending: REGISTERED NURSE
Payer: COMMERCIAL

## 2023-04-25 PROCEDURE — 97112 NEUROMUSCULAR REEDUCATION: CPT | Mod: GP | Performed by: PHYSICAL THERAPIST

## 2023-04-28 ENCOUNTER — OFFICE VISIT (OUTPATIENT)
Dept: FAMILY MEDICINE | Facility: CLINIC | Age: 33
End: 2023-04-28
Payer: COMMERCIAL

## 2023-04-28 VITALS
OXYGEN SATURATION: 98 % | HEIGHT: 62 IN | WEIGHT: 122 LBS | SYSTOLIC BLOOD PRESSURE: 102 MMHG | HEART RATE: 82 BPM | TEMPERATURE: 98.7 F | RESPIRATION RATE: 18 BRPM | DIASTOLIC BLOOD PRESSURE: 68 MMHG | BODY MASS INDEX: 22.45 KG/M2

## 2023-04-28 DIAGNOSIS — H81.12 BENIGN PAROXYSMAL POSITIONAL VERTIGO OF LEFT EAR: ICD-10-CM

## 2023-04-28 DIAGNOSIS — R53.83 OTHER FATIGUE: ICD-10-CM

## 2023-04-28 DIAGNOSIS — Z83.3 FAMILY HISTORY OF DIABETES MELLITUS: ICD-10-CM

## 2023-04-28 DIAGNOSIS — Z11.4 SCREENING FOR HIV (HUMAN IMMUNODEFICIENCY VIRUS): ICD-10-CM

## 2023-04-28 DIAGNOSIS — Z00.01 ENCOUNTER FOR ROUTINE ADULT PHYSICAL EXAM WITH ABNORMAL FINDINGS: Primary | ICD-10-CM

## 2023-04-28 DIAGNOSIS — Z11.59 NEED FOR HEPATITIS C SCREENING TEST: ICD-10-CM

## 2023-04-28 PROCEDURE — 99213 OFFICE O/P EST LOW 20 MIN: CPT | Mod: 25 | Performed by: NURSE PRACTITIONER

## 2023-04-28 PROCEDURE — 99395 PREV VISIT EST AGE 18-39: CPT | Performed by: NURSE PRACTITIONER

## 2023-04-28 ASSESSMENT — ENCOUNTER SYMPTOMS
HEMATURIA: 0
HEARTBURN: 0
PARESTHESIAS: 0
COUGH: 0
HEMATOCHEZIA: 0
EYE PAIN: 0
PALPITATIONS: 0
FEVER: 0
CHILLS: 0
DIZZINESS: 0
DYSURIA: 0
FREQUENCY: 0
BREAST MASS: 0
WEAKNESS: 0
JOINT SWELLING: 0
DIARRHEA: 0
HEADACHES: 0
MYALGIAS: 0
CONSTIPATION: 0
NERVOUS/ANXIOUS: 0
ARTHRALGIAS: 0
SORE THROAT: 0
SHORTNESS OF BREATH: 0
ABDOMINAL PAIN: 0
NAUSEA: 0

## 2023-04-28 ASSESSMENT — PAIN SCALES - GENERAL: PAINLEVEL: NO PAIN (0)

## 2023-04-28 NOTE — PROGRESS NOTES
SUBJECTIVE:   CC: Celine is an 33 year old who presents for preventive health visit.       4/28/2023     3:39 PM   Additional Questions   Roomed by Nessa PACE CMA   Patient has been advised of split billing requirements and indicates understanding: Yes  Healthy Habits:     Getting at least 3 servings of Calcium per day:  NO    Bi-annual eye exam:  Yes    Dental care twice a year:  Yes    Sleep apnea or symptoms of sleep apnea:  None    Diet:  Regular (no restrictions)    Frequency of exercise:  2-3 days/week    Duration of exercise:  30-45 minutes    Taking medications regularly:  Yes    Medication side effects:  None    PHQ-2 Total Score: 0    Additional concerns today:  Yes    -Patient was seen in the ER earlier this month and diagnosed with BPPV and has been meeting with a Physical Therapist. Wondering if there is a different ENT she can be referred to as the ENT she was referred to doesn't have any openings until August.   Physical Therapy weekly . .. inner ear inflammation  Would like to see ENT    Eye exam due up to 2 yrs   Due  Dental exam done 2/2023    No new skin changes.     Family history of diabetes    -Wondering what form of birth control she should be on.  The provider she saw in the ER told her that she needed to stop the Nuvaring right away and that she shouldn't be on that kind of birth control at her age.  Non-smoker.  No history of clotting disorder  Has had a tubal ligation in the past  Using estrogen for period regulation    Has GYN follow up scheduled in [place] for Pap and Breast exam does not desire  Same partner for 15 years  Declines HIV and hep C screening        -------------------------------------    Today's PHQ-2 Score:       4/28/2023     5:39 AM   PHQ-2 ( 1999 [employer])   Q1: Little interest or pleasure in doing things 0   Q2: Feeling down, depressed or hopeless 0   PHQ-2 Score 0   Q1: Little interest or pleasure in doing things Not at all    Not at all   Q2: Feeling down, depressed  or hopeless Not at all    Not at all   PHQ-2 Score 0    0       Have you ever done Advance Care Planning? (For example, a Health Directive, POLST, or a discussion with a medical provider or your loved ones about your wishes): No, advance care planning information given to patient to review.  Patient declined advance care planning discussion at this time.    Social History     Tobacco Use     Smoking status: Never     Smokeless tobacco: Never   Vaping Use     Vaping status: Never Used   Substance Use Topics     Alcohol use: Not Currently             2023     5:39 AM   Alcohol Use   Prescreen: >3 drinks/day or >7 drinks/week? No     Reviewed orders with patient.  Reviewed health maintenance and updated orders accordingly - Yes  Labs reviewed in EPIC    Breast Cancer Screenin/28/2023     5:40 AM   Breast CA Risk Assessment (FHS-7)   Do you have a family history of breast, colon, or ovarian cancer? No / Unknown         Patient under 40 years of age: Routine Mammogram Screening not recommended.   Pertinent mammograms are reviewed under the imaging tab.    History of abnormal Pap smear: NO - age 30-65 PAP every 5 years with negative HPV co-testing recommended     Reviewed and updated as needed this visit by clinical staff   Tobacco  Allergies  Meds              Reviewed and updated as needed this visit by Provider                 History reviewed. No pertinent past medical history.   Past Surgical History:   Procedure Laterality Date      SECTION Right 2020    with tubal ligation     GYN SURGERY         Review of Systems   Constitutional: Negative for chills and fever.   HENT: Negative for congestion, ear pain, hearing loss and sore throat.    Eyes: Negative for pain and visual disturbance.   Respiratory: Negative for cough and shortness of breath.    Cardiovascular: Negative for chest pain, palpitations and peripheral edema.   Gastrointestinal: Negative for abdominal pain, constipation,  "diarrhea, heartburn, hematochezia and nausea.   Breasts:  Negative for tenderness, breast mass and discharge.   Genitourinary: Negative for dysuria, frequency, genital sores, hematuria, pelvic pain, urgency, vaginal bleeding and vaginal discharge.   Musculoskeletal: Negative for arthralgias, joint swelling and myalgias.   Skin: Negative for rash.   Neurological: Negative for dizziness, weakness, headaches and paresthesias.   Psychiatric/Behavioral: Negative for mood changes. The patient is not nervous/anxious.           OBJECTIVE:   /68 (BP Location: Right arm, Patient Position: Sitting, Cuff Size: Adult Regular)   Pulse 82   Temp 98.7  F (37.1  C) (Tympanic)   Resp 18   Ht 1.575 m (5' 2\")   Wt 55.3 kg (122 lb)   LMP  (LMP Unknown)   SpO2 98%   BMI 22.31 kg/m    Physical Exam  GENERAL: healthy, alert and no distress  EYES: Eyes grossly normal to inspection, PERRL and conjunctivae and sclerae normal  HENT: ear canals and TM's normal, nose and mouth without ulcers or lesions  NECK: no adenopathy, no asymmetry, masses, or scars and thyroid normal to palpation  RESP: lungs clear to auscultation - no rales, rhonchi or wheezes  CV: regular rate and rhythm, normal S1 S2, no S3 or S4, no murmur, click or rub, no peripheral edema and peripheral pulses strong  ABDOMEN: soft, nontender, no hepatosplenomegaly, no masses and bowel sounds normal  MS: no gross musculoskeletal defects noted, no edema  SKIN: no suspicious lesions or rashes  NEURO: Normal strength and tone, mentation intact and speech normal  PSYCH: mentation appears normal, affect normal/bright    Diagnostic Test Results:  Labs reviewed in Epic      ASSESSMENT/PLAN:   Celine was seen today for physical.    Diagnoses and all orders for this visit:    Encounter for routine adult physical exam with abnormal findings    Benign paroxysmal positional vertigo of left ear  Continue physical therapy  Other ENT phone numbers given  Labs done   -     Basic " metabolic panel  (Ca, Cl, CO2, Creat, Gluc, K, Na, BUN); Future    Screening for HIV (human immunodeficiency virus)  Declined    Need for hepatitis C screening test  Declined    Family history of diabetes mellitus  Basic metabolic panel  Prevention strategies reviewed increase physical activity  Avoid high fructose corn syrup, white bread white rice white pasta white potatoes and white sugar    Other fatigue  Labs today to look for other cause  -     CBC with platelets and differential; Future  -     TSH with free T4 reflex; Future  -     Lipid panel reflex to direct LDL Fasting; Future    Follow-up with OB/GYN for pelvic exam and breast exam as previously planned          COUNSELING:  Reviewed preventive health counseling, as reflected in patient instructions        She reports that she has never smoked. She has never used smokeless tobacco.      Call or return to the clinic with any worsening of symptoms or no resolution. Patient/Parent verbalized understanding and is in agreement. Medication side effects reviewed.   Current Outpatient Medications   Medication Sig Dispense Refill     clindamycin (CLEOCIN T) 1 % external lotion        multivitamin w/minerals (THERA-VIT-M) tablet Take 1 tablet by mouth daily       ondansetron (ZOFRAN ODT) 4 MG ODT tab Take 1-2 tablets (4-8 mg) by mouth every 6 hours as needed for nausea 10 tablet 0     tretinoin (RETIN-A) 0.025 % external cream        Chart documentation with Dragon Voice recognition Software. Although reviewed after completion, some words and grammatical errors may remain.  Josi Ramos, APRN CNP  [facility]

## 2023-05-02 ENCOUNTER — HOSPITAL ENCOUNTER (OUTPATIENT)
Dept: PHYSICAL THERAPY | Facility: CLINIC | Age: 33
Setting detail: THERAPIES SERIES
Discharge: HOME | End: 2023-05-02
Attending: REGISTERED NURSE
Payer: COMMERCIAL

## 2023-05-02 PROCEDURE — 97112 NEUROMUSCULAR REEDUCATION: CPT | Mod: GP | Performed by: PHYSICAL THERAPIST

## 2023-05-07 ENCOUNTER — MYC MEDICAL ADVICE (OUTPATIENT)
Dept: FAMILY MEDICINE | Facility: CLINIC | Age: 33
End: 2023-05-07
Payer: COMMERCIAL

## 2023-05-09 ENCOUNTER — HOSPITAL ENCOUNTER (OUTPATIENT)
Dept: PHYSICAL THERAPY | Facility: CLINIC | Age: 33
Setting detail: THERAPIES SERIES
Discharge: HOME | End: 2023-05-09
Attending: NURSE PRACTITIONER
Payer: COMMERCIAL

## 2023-05-09 PROCEDURE — 97112 NEUROMUSCULAR REEDUCATION: CPT | Mod: GP | Performed by: PHYSICAL THERAPIST

## 2023-05-11 ENCOUNTER — LAB (OUTPATIENT)
Dept: LAB | Facility: CLINIC | Age: 33
End: 2023-05-11
Payer: COMMERCIAL

## 2023-05-11 DIAGNOSIS — Z83.3 FAMILY HISTORY OF DIABETES MELLITUS: ICD-10-CM

## 2023-05-11 DIAGNOSIS — H81.12 BENIGN PAROXYSMAL POSITIONAL VERTIGO OF LEFT EAR: ICD-10-CM

## 2023-05-11 DIAGNOSIS — R53.83 OTHER FATIGUE: ICD-10-CM

## 2023-05-11 LAB
ANION GAP SERPL CALCULATED.3IONS-SCNC: 10 MMOL/L (ref 7–15)
BASOPHILS # BLD AUTO: 0 10E3/UL (ref 0–0.2)
BASOPHILS NFR BLD AUTO: 0 %
BUN SERPL-MCNC: 10.8 MG/DL (ref 6–20)
CALCIUM SERPL-MCNC: 9 MG/DL (ref 8.6–10)
CHLORIDE SERPL-SCNC: 108 MMOL/L (ref 98–107)
CHOLEST SERPL-MCNC: 175 MG/DL
CREAT SERPL-MCNC: 0.81 MG/DL (ref 0.51–0.95)
DEPRECATED HCO3 PLAS-SCNC: 22 MMOL/L (ref 22–29)
EOSINOPHIL # BLD AUTO: 0.1 10E3/UL (ref 0–0.7)
EOSINOPHIL NFR BLD AUTO: 4 %
ERYTHROCYTE [DISTWIDTH] IN BLOOD BY AUTOMATED COUNT: 12 % (ref 10–15)
GFR SERPL CREATININE-BSD FRML MDRD: >90 ML/MIN/1.73M2
GLUCOSE SERPL-MCNC: 83 MG/DL (ref 70–99)
HCT VFR BLD AUTO: 37.7 % (ref 35–47)
HDLC SERPL-MCNC: 66 MG/DL
HGB BLD-MCNC: 12.7 G/DL (ref 11.7–15.7)
IMM GRANULOCYTES # BLD: 0 10E3/UL
IMM GRANULOCYTES NFR BLD: 0 %
LDLC SERPL CALC-MCNC: 92 MG/DL
LYMPHOCYTES # BLD AUTO: 1.5 10E3/UL (ref 0.8–5.3)
LYMPHOCYTES NFR BLD AUTO: 43 %
MCH RBC QN AUTO: 30.8 PG (ref 26.5–33)
MCHC RBC AUTO-ENTMCNC: 33.7 G/DL (ref 31.5–36.5)
MCV RBC AUTO: 91 FL (ref 78–100)
MONOCYTES # BLD AUTO: 0.3 10E3/UL (ref 0–1.3)
MONOCYTES NFR BLD AUTO: 8 %
NEUTROPHILS # BLD AUTO: 1.6 10E3/UL (ref 1.6–8.3)
NEUTROPHILS NFR BLD AUTO: 45 %
NONHDLC SERPL-MCNC: 109 MG/DL
PLATELET # BLD AUTO: 219 10E3/UL (ref 150–450)
POTASSIUM SERPL-SCNC: 4.2 MMOL/L (ref 3.4–5.3)
RBC # BLD AUTO: 4.13 10E6/UL (ref 3.8–5.2)
SODIUM SERPL-SCNC: 140 MMOL/L (ref 136–145)
TRIGL SERPL-MCNC: 87 MG/DL
TSH SERPL DL<=0.005 MIU/L-ACNC: 1.84 UIU/ML (ref 0.3–4.2)
WBC # BLD AUTO: 3.5 10E3/UL (ref 4–11)

## 2023-05-11 PROCEDURE — 36415 COLL VENOUS BLD VENIPUNCTURE: CPT

## 2023-05-11 PROCEDURE — 80061 LIPID PANEL: CPT

## 2023-05-11 PROCEDURE — 85025 COMPLETE CBC W/AUTO DIFF WBC: CPT

## 2023-05-11 PROCEDURE — 80048 BASIC METABOLIC PNL TOTAL CA: CPT

## 2023-05-11 PROCEDURE — 84443 ASSAY THYROID STIM HORMONE: CPT

## 2023-05-18 ENCOUNTER — MYC MEDICAL ADVICE (OUTPATIENT)
Dept: FAMILY MEDICINE | Facility: CLINIC | Age: 33
End: 2023-05-18
Payer: COMMERCIAL

## 2023-05-18 DIAGNOSIS — R53.83 OTHER FATIGUE: Primary | ICD-10-CM

## 2023-05-22 ENCOUNTER — OFFICE VISIT (OUTPATIENT)
Dept: OBGYN | Facility: CLINIC | Age: 33
End: 2023-05-22
Payer: COMMERCIAL

## 2023-05-22 VITALS
HEIGHT: 62 IN | TEMPERATURE: 98.4 F | DIASTOLIC BLOOD PRESSURE: 66 MMHG | BODY MASS INDEX: 22.63 KG/M2 | WEIGHT: 123 LBS | SYSTOLIC BLOOD PRESSURE: 100 MMHG | RESPIRATION RATE: 16 BRPM | HEART RATE: 76 BPM

## 2023-05-22 DIAGNOSIS — N93.9 ABNORMAL UTERINE BLEEDING (AUB): ICD-10-CM

## 2023-05-22 DIAGNOSIS — Z12.39 ENCOUNTER FOR SCREENING BREAST EXAMINATION: ICD-10-CM

## 2023-05-22 DIAGNOSIS — Z12.4 CERVICAL CANCER SCREENING: Primary | ICD-10-CM

## 2023-05-22 PROCEDURE — 87624 HPV HI-RISK TYP POOLED RSLT: CPT | Performed by: OBSTETRICS & GYNECOLOGY

## 2023-05-22 PROCEDURE — 99214 OFFICE O/P EST MOD 30 MIN: CPT | Performed by: OBSTETRICS & GYNECOLOGY

## 2023-05-22 PROCEDURE — G0145 SCR C/V CYTO,THINLAYER,RESCR: HCPCS | Performed by: OBSTETRICS & GYNECOLOGY

## 2023-05-22 RX ORDER — AZELASTINE 1 MG/ML
SPRAY, METERED NASAL
COMMUNITY
Start: 2023-03-26 | End: 2024-06-03

## 2023-05-22 RX ORDER — ETONOGESTREL AND ETHINYL ESTRADIOL VAGINAL RING .015; .12 MG/D; MG/D
1 RING VAGINAL
Qty: 3 EACH | Refills: 4 | Status: SHIPPED | OUTPATIENT
Start: 2023-05-22 | End: 2024-06-03

## 2023-05-22 NOTE — PROGRESS NOTES
[facility]    Gynecology Office Note    CC:   Chief Complaint   Patient presents with     Physical     pap        HPI: Celine Potts is a 33 year old  who presents for pap smear and breast exam. No concerns today. Denies any vaginal complaints or breast concerns. She does have questions regarding the continued use of Nuvaring. She stopped using after being seen in the ED for vertigo.     GYN Hx:     Patient's last menstrual period was 2023.  History of heavy menses after her last delivery. Previously controlled with Nuvaring.     ROS: A 10 pt ROS was completed and found to be otherwise negative unless mentioned in the HPI.     PMH:   No past medical history on file.    PSHx:   Past Surgical History:   Procedure Laterality Date      SECTION Right 2020    with tubal ligation     GYN SURGERY         OBHx:   OB History    Para Term  AB Living   2 0 0 0 0 2   SAB IAB Ectopic Multiple Live Births   0 0 0 0 0      # Outcome Date GA Lbr Bob/2nd Weight Sex Delivery Anes PTL Lv   2             1                 Medications:   azelastine (ASTELIN) 0.1 % nasal spray,   clindamycin (CLEOCIN T) 1 % external lotion,   multivitamin w/minerals (THERA-VIT-M) tablet, Take 1 tablet by mouth daily  ondansetron (ZOFRAN ODT) 4 MG ODT tab, Take 1-2 tablets (4-8 mg) by mouth every 6 hours as needed for nausea  tretinoin (RETIN-A) 0.025 % external cream,     No current facility-administered medications on file prior to visit.      Allergies:    No Known Allergies    Social History:   Social History     Socioeconomic History     Marital status:      Spouse name: Not on file     Number of children: Not on file     Years of education: Not on file     Highest education level: Not on file   Occupational History     Not on file   Tobacco Use     Smoking status: Never     Smokeless tobacco: Never   Vaping Use     Vaping status: Never Used   Substance and Sexual  "Activity     Alcohol use: Not Currently     Drug use: Not Currently     Types: Cocaine, Marijuana, LSD, MDMA (Ecstasy), Psilocybin     Comment: Have tried when younger 1-2x, no continued use     Sexual activity: Yes     Partners: Male     Birth control/protection: Male Surgical, Female Surgical     Comment: discontinued use of nuvaring (regulate period)   Other Topics Concern     Parent/sibling w/ CABG, MI or angioplasty before 65F 55M? No   Social History Narrative     Not on file     Social Determinants of Health     Financial Resource Strain: Not on file   Food Insecurity: Not on file   Transportation Needs: Not on file   Physical Activity: Not on file   Stress: Not on file   Social Connections: Not on file   Intimate Partner Violence: Not on file   Housing Stability: Not on file         Family History:   Family History   Problem Relation Age of Onset     Diabetes Father         Diabetes men on fathers side     Substance Abuse Father      Diabetes Paternal Grandfather        Physical Exam:   Vitals:    05/22/23 0837   BP: 100/66   BP Location: Right arm   Patient Position: Chair   Cuff Size: Adult Regular   Pulse: 76   Resp: 16   Temp: 98.4  F (36.9  C)   TempSrc: Tympanic   Weight: 55.8 kg (123 lb)   Height: 1.575 m (5' 2\")      Estimated body mass index is 22.5 kg/m  as calculated from the following:    Height as of this encounter: 1.575 m (5' 2\").    Weight as of this encounter: 55.8 kg (123 lb).    General appearance: well-hydrated, A&O x 3, no apparent distress  Lungs: Equal expansion bilaterally, no accessory muscle use  Heart: No heaves or thrills.   Constitutional: See vitals  Abdomen: Soft, non-tender, non-distended. No rebound, rigidity, or guarding.  Neuro: CN II-XII grossly intact  Genitourinary:  External genitalia: no erythema, no lesions.   Urethral meatus appropriate location without lesions or prolapse  Bladder no fullness, masses, or tenderness.  Anus and Perineum: Unremarkable, no visible " lesions  Vagina: Normal, healthy pink mucosa without any lesions. Physiologic vaginal discharge.   Cervix: normal appearance, no cervical motion tenderness.   Uterus: normal size, shape and consistency.   Adnexa: no masses or tenderness bilaterally.  Breast: No masses, skin, nipple or axillary changes        Assessment and Plan:     Encounter Diagnoses   Name Primary?     Cervical cancer screening Yes     Encounter for screening breast examination      Abnormal uterine bleeding (AUB)      Pap smear collected today. Breast exam normal, discussed mammogram screening starting age 40-45.     Patient had previously been using Nuvaring to control heavy menstrual bleeding, worsening after her second child. History of tubal ligation. She stopped after being seen in the ED due to vertigo. Concern for history of migraines. Reviewed history today. Isolated migraine 10 years ago, no issues since, was previously tolerating combined contraceptive methods. Plan to continue Nuvaring. Discussed warning signs for side effects.       Health maintenance: pap smear collected today    Return to clinic as needed.

## 2023-05-22 NOTE — NURSING NOTE
"Initial /66 (BP Location: Right arm, Patient Position: Chair, Cuff Size: Adult Regular)   Pulse 76   Temp 98.4  F (36.9  C) (Tympanic)   Resp 16   Ht 1.575 m (5' 2\")   Wt 55.8 kg (123 lb)   LMP 05/09/2023   BMI 22.50 kg/m   Estimated body mass index is 22.5 kg/m  as calculated from the following:    Height as of this encounter: 1.575 m (5' 2\").    Weight as of this encounter: 55.8 kg (123 lb). .      "

## 2023-05-25 ENCOUNTER — VIRTUAL VISIT (OUTPATIENT)
Dept: URGENT CARE | Facility: CLINIC | Age: 33
End: 2023-05-25
Payer: COMMERCIAL

## 2023-05-25 DIAGNOSIS — Z53.9 ERRONEOUS ENCOUNTER--DISREGARD: Primary | ICD-10-CM

## 2023-05-25 LAB
BKR LAB AP GYN ADEQUACY: NORMAL
BKR LAB AP GYN INTERPRETATION: NORMAL
BKR LAB AP HPV REFLEX: NORMAL
BKR LAB AP LMP: NORMAL
BKR LAB AP PREVIOUS ABNORMAL: NORMAL
PATH REPORT.COMMENTS IMP SPEC: NORMAL
PATH REPORT.COMMENTS IMP SPEC: NORMAL
PATH REPORT.RELEVANT HX SPEC: NORMAL

## 2023-05-29 LAB
HUMAN PAPILLOMA VIRUS 16 DNA: NEGATIVE
HUMAN PAPILLOMA VIRUS 18 DNA: NEGATIVE
HUMAN PAPILLOMA VIRUS FINAL DIAGNOSIS: NORMAL
HUMAN PAPILLOMA VIRUS OTHER HR: NEGATIVE

## 2023-06-08 ENCOUNTER — THERAPY VISIT (OUTPATIENT)
Dept: PHYSICAL THERAPY | Facility: CLINIC | Age: 33
End: 2023-06-08
Attending: NURSE PRACTITIONER
Payer: COMMERCIAL

## 2023-06-08 DIAGNOSIS — R42 DIZZINESS: ICD-10-CM

## 2023-06-08 PROCEDURE — 97112 NEUROMUSCULAR REEDUCATION: CPT | Mod: GP | Performed by: PHYSICAL THERAPIST

## 2023-06-08 NOTE — PROGRESS NOTES
DISCHARGE  Reason for Discharge: Patient has met all goals.  No further expectation of progress.    Equipment Issued: none    Discharge Plan: Patient to continue home program.    Referring Provider:  Josi Ramos       06/08/23 0500   Appointment Info   Signing clinician's name / credentials Simona Nik PT DPT CLt   Visits Used 6 [employer]   Medical Diagnosis Dizziness R42   PT Tx Diagnosis BPPV: L posterior and L horizontal cupulolithasis   Progress Note/Certification   Onset of illness/injury or Date of Surgery 04/06/23   PT Goal 1   Goal Identifier felicitas hallpike   Goal Description Pt will have negative testing with felicitas hallpike in order to have improved bed mobility.   Target Date 05/24/23   Date Met 06/08/23   PT Goal 2   Goal Identifier roll test   Goal Description Pt will demonstrate negative testing with roll test in order to have reduced symptoms with rolling in bed.   Target Date 06/07/23   Date Met 06/08/23   PT Goal 3   Goal Identifier Driving   Goal Description Pt will reports 75% reduction in symptoms in order to tolerate driving.   Target Date 06/07/23   Date Met 06/08/23   Subjective Report   Subjective Report Reports about 75% improvement since evaluation.   Objective Measures   Objective Measures Objective Measure 1;Objective Measure 2;Objective Measure 3   Objective Measure 1   Objective Measure Gaze Stabilization   Details 15 sec at 220 bpm   Objective Measure 2   Objective Measure DVA   Details Baseline line 11; 2 Hz: line 7   Treatment Interventions (PT)   Interventions Standard Canalith Repositioning;Neuromuscular Re-education   Neuromuscular Re-education   Neuromuscular re-ed of mvmt, balance, coord, kinesthetic sense, posture, proprioception minutes (39556) 15   Skilled Intervention gaze stabilization   Neuromuscular Re-education Neuro Re-ed 2;Neuro Re-ed 3;Neuro Re-ed 4   Neuro Re-ed 1 gaze stabilization   Neuro Re-ed 1 - Details horizontal 220 bpm x 15 sec   Neuro Re-ed 2 walking  horizontal head turns   Neuro Re-ed 2 - Details x 20 feet   Neuro Re-ed 3 walking vertical head turns x 15 feet   Cervicogenic Screen   Neck ROM Full ROM   Vertebral Artery Test Normal   Alar Ligament Test Normal   Transverse Ligament Test Normal   Oculomotor Exam   Convergence Testing Comments    Infrared Goggle Exam or Frenzel Lense Exam   Vestibular Suppressant in Last 24 Hours? No   Spontaneous Nystagmus Negative   [place]-Hallpike (right) Negative   [place]-Hallpike (Left) Negative   [facility] Supine Roll Test (Right) Negative   [facility] Supine Roll Test (Left) Negative   Education   Learner/Method Patient   Education Comments No need to continue with exercises   Plan   Homework Gaze stabilization as needed   Plan for next session discharge   Medicare Claim Information   Medical Diagnosis Dizziness R42   PT Outcome Measures   Dizziness Handicap Inventory (score out of 100) A decrease in score by 17.18 or greater indicates a clinically significant change in symptoms. 92     Simona Zaragoza, PT, DPT, CLT  Physical Therapist & Certified Lymphedema Therapist  [facility]

## 2023-06-15 ENCOUNTER — TRANSFERRED RECORDS (OUTPATIENT)
Dept: HEALTH INFORMATION MANAGEMENT | Facility: CLINIC | Age: 33
End: 2023-06-15
Payer: COMMERCIAL

## 2023-06-29 ENCOUNTER — LAB (OUTPATIENT)
Dept: LAB | Facility: CLINIC | Age: 33
End: 2023-06-29
Payer: COMMERCIAL

## 2023-06-29 DIAGNOSIS — R53.83 OTHER FATIGUE: ICD-10-CM

## 2023-06-29 LAB
BASOPHILS # BLD AUTO: 0 10E3/UL (ref 0–0.2)
BASOPHILS NFR BLD AUTO: 0 %
EOSINOPHIL # BLD AUTO: 0.1 10E3/UL (ref 0–0.7)
EOSINOPHIL NFR BLD AUTO: 3 %
ERYTHROCYTE [DISTWIDTH] IN BLOOD BY AUTOMATED COUNT: 12.3 % (ref 10–15)
HCT VFR BLD AUTO: 40 % (ref 35–47)
HGB BLD-MCNC: 13 G/DL (ref 11.7–15.7)
IMM GRANULOCYTES # BLD: 0 10E3/UL
IMM GRANULOCYTES NFR BLD: 0 %
LYMPHOCYTES # BLD AUTO: 1.4 10E3/UL (ref 0.8–5.3)
LYMPHOCYTES NFR BLD AUTO: 30 %
MCH RBC QN AUTO: 29.7 PG (ref 26.5–33)
MCHC RBC AUTO-ENTMCNC: 32.5 G/DL (ref 31.5–36.5)
MCV RBC AUTO: 92 FL (ref 78–100)
MONOCYTES # BLD AUTO: 0.3 10E3/UL (ref 0–1.3)
MONOCYTES NFR BLD AUTO: 6 %
NEUTROPHILS # BLD AUTO: 2.9 10E3/UL (ref 1.6–8.3)
NEUTROPHILS NFR BLD AUTO: 61 %
PLATELET # BLD AUTO: 215 10E3/UL (ref 150–450)
RBC # BLD AUTO: 4.37 10E6/UL (ref 3.8–5.2)
WBC # BLD AUTO: 4.8 10E3/UL (ref 4–11)

## 2023-06-29 PROCEDURE — 85025 COMPLETE CBC W/AUTO DIFF WBC: CPT

## 2023-06-29 PROCEDURE — 36415 COLL VENOUS BLD VENIPUNCTURE: CPT

## 2024-03-29 ENCOUNTER — PATIENT OUTREACH (OUTPATIENT)
Dept: CARE COORDINATION | Facility: CLINIC | Age: 34
End: 2024-03-29
Payer: COMMERCIAL

## 2024-04-12 ENCOUNTER — PATIENT OUTREACH (OUTPATIENT)
Dept: CARE COORDINATION | Facility: CLINIC | Age: 34
End: 2024-04-12
Payer: COMMERCIAL

## 2024-06-03 DIAGNOSIS — N93.9 ABNORMAL UTERINE BLEEDING (AUB): ICD-10-CM

## 2024-06-03 DIAGNOSIS — R09.81 CONGESTION OF PARANASAL SINUS: Primary | ICD-10-CM

## 2024-06-03 RX ORDER — AZELASTINE 1 MG/ML
2 SPRAY, METERED NASAL 2 TIMES DAILY
Qty: 30 ML | Refills: 0 | Status: SHIPPED | OUTPATIENT
Start: 2024-06-03

## 2024-06-03 RX ORDER — ETONOGESTREL AND ETHINYL ESTRADIOL VAGINAL RING .015; .12 MG/D; MG/D
1 RING VAGINAL
Qty: 3 EACH | Refills: 0 | Status: SHIPPED | OUTPATIENT
Start: 2024-06-03

## 2024-06-03 NOTE — TELEPHONE ENCOUNTER
Last Written Prescription Date:  5/22/23  Last Fill Quantity: 3,  # refills: 4   Last office visit: 5/22/2023 with Dr Wang  Future Office Visit:        Requested Prescriptions   Pending Prescriptions Disp Refills    etonogestrel-ethinyl estradiol (NUVARING) 0.12-0.015 MG/24HR vaginal ring 3 each 0     Sig: Place 1 each vaginally every 28 days       Contraceptives Protocol Failed - 6/3/2024 12:33 PM        Failed - Recent (12 mo) or future (30 days) visit within the authorizing provider's specialty     The patient must have completed an in-person or virtual visit within the past 12 months or has a future visit scheduled within the next 90 days with the authorizing provider s specialty.  Urgent care and e-visits do not quality as an office visit for this protocol.          Failed - Medication indicated for associated diagnosis     Medication is associated with one or more of the following diagnoses:  Contraception  Acne  Dysmenorrhea  Menorrhagia  Amenorrhea  PCOS  Premenstrual Dysphoric Disorder          Passed - Patient is not a current smoker if age is 35 or older        Passed - Medication is active on med list        Passed - No active pregnancy on record        Passed - No positive pregnancy test in past 12 months           Medication is being filled for 1 time refill only due to:  Patient needs to be seen because it has been more than one year since last visit.  PhoneTell message sent to pt to inform of need for appt to continue medication    THEA Cole  [facility] Clinic

## 2024-07-28 ENCOUNTER — HEALTH MAINTENANCE LETTER (OUTPATIENT)
Age: 34
End: 2024-07-28

## 2024-10-10 ENCOUNTER — OFFICE VISIT (OUTPATIENT)
Dept: FAMILY MEDICINE | Facility: CLINIC | Age: 34
End: 2024-10-10
Payer: COMMERCIAL

## 2024-10-10 VITALS
WEIGHT: 125.4 LBS | TEMPERATURE: 98.3 F | HEART RATE: 79 BPM | BODY MASS INDEX: 23.08 KG/M2 | SYSTOLIC BLOOD PRESSURE: 98 MMHG | OXYGEN SATURATION: 99 % | RESPIRATION RATE: 20 BRPM | HEIGHT: 62 IN | DIASTOLIC BLOOD PRESSURE: 58 MMHG

## 2024-10-10 DIAGNOSIS — Z00.01 ENCOUNTER FOR ROUTINE ADULT PHYSICAL EXAM WITH ABNORMAL FINDINGS: Primary | ICD-10-CM

## 2024-10-10 DIAGNOSIS — K59.09 OTHER CONSTIPATION: ICD-10-CM

## 2024-10-10 DIAGNOSIS — N93.9 ABNORMAL UTERINE BLEEDING (AUB): ICD-10-CM

## 2024-10-10 LAB
HGB BLD-MCNC: 13.8 G/DL (ref 11.7–15.7)
TSH SERPL DL<=0.005 MIU/L-ACNC: 2.28 UIU/ML (ref 0.3–4.2)

## 2024-10-10 PROCEDURE — 36415 COLL VENOUS BLD VENIPUNCTURE: CPT | Performed by: NURSE PRACTITIONER

## 2024-10-10 PROCEDURE — 85018 HEMOGLOBIN: CPT | Performed by: NURSE PRACTITIONER

## 2024-10-10 PROCEDURE — 99213 OFFICE O/P EST LOW 20 MIN: CPT | Mod: 25 | Performed by: NURSE PRACTITIONER

## 2024-10-10 PROCEDURE — 84443 ASSAY THYROID STIM HORMONE: CPT | Performed by: NURSE PRACTITIONER

## 2024-10-10 PROCEDURE — 99395 PREV VISIT EST AGE 18-39: CPT | Performed by: NURSE PRACTITIONER

## 2024-10-10 RX ORDER — FLUTICASONE PROPIONATE 50 MCG
1 SPRAY, SUSPENSION (ML) NASAL DAILY
COMMUNITY
End: 2024-10-10

## 2024-10-10 RX ORDER — ETONOGESTREL AND ETHINYL ESTRADIOL VAGINAL RING .015; .12 MG/D; MG/D
1 RING VAGINAL
Qty: 3 EACH | Refills: 3 | Status: SHIPPED | OUTPATIENT
Start: 2024-10-10

## 2024-10-10 SDOH — HEALTH STABILITY: PHYSICAL HEALTH: ON AVERAGE, HOW MANY DAYS PER WEEK DO YOU ENGAGE IN MODERATE TO STRENUOUS EXERCISE (LIKE A BRISK WALK)?: 2 DAYS

## 2024-10-10 ASSESSMENT — PAIN SCALES - GENERAL: PAINLEVEL: NO PAIN (0)

## 2024-10-10 ASSESSMENT — SOCIAL DETERMINANTS OF HEALTH (SDOH): HOW OFTEN DO YOU GET TOGETHER WITH FRIENDS OR RELATIVES?: ONCE A WEEK

## 2024-10-10 NOTE — PROGRESS NOTES
Preventive Care Visit  [facility]  LIV Ventura CNP, Family Medicine  Oct 10, 2024      Assessment & Plan     Encounter for routine adult physical exam with abnormal findings      Abnormal uterine bleeding (AUB)  Restart if covered by insurance.   - etonogestrel-ethinyl estradiol (NUVARING) 0.12-0.015 MG/24HR vaginal ring  Dispense: 3 each; Refill: 3  - Hemoglobin      Other constipation  Symptomatic care strategies reviewed    - TSH with free T4 reflex              Counseling  Appropriate preventive services were addressed with this patient via screening, questionnaire, or discussion as appropriate for fall prevention, nutrition, physical activity, Tobacco-use cessation, social engagement, weight loss and cognition.  Checklist reviewing preventive services available has been given to the patient.  Reviewed patient's diet, addressing concerns and/or questions.   She is at risk for lack of exercise and has been provided with information to increase physical activity for the benefit of her well-being.       Call or return to the clinic with any worsening of symptoms or no resolution. Patient/Parent verbalized understanding and is in agreement. Medication side effects reviewed.   Current Outpatient Medications   Medication Sig Dispense Refill    azelastine (ASTELIN) 0.1 % nasal spray Spray 2 sprays into both nostrils 2 times daily 30 mL 0    etonogestrel-ethinyl estradiol (NUVARING) 0.12-0.015 MG/24HR vaginal ring Place 1 each vaginally every 28 days. 3 each 3    Multiple Vitamins-Minerals (IMMUNE SUPPORT PO) Take by mouth.      tretinoin (RETIN-A) 0.025 % external cream        Chart documentation with Dragon Voice recognition Software. Although reviewed after completion, some words and grammatical errors may remain.  Josi Ramos MSN,FNP-BC  [facility]  [id number] [place] [id number]  [phone]          Ann   Celine is a 34  year old, presenting for the following:  Physical        10/10/2024     7:44 AM   Additional Questions   Roomed by Nessa PACE CMA        Health Care Directive  Patient does not have a Health Care Directive or Living Will:     HPI  -Would like to discuss what she can do to improve gut health.     -Declines flu and covid vaccine today as she is going out of town.   Constipation for years 2 x a week  Normal stooling at that time  Water 60 ounces a day  Coffee in the morning  No vaccines today                10/10/2024   General Health   How would you rate your overall physical health? Good   Feel stress (tense, anxious, or unable to sleep) Not at all            10/10/2024   Nutrition   Three or more servings of calcium each day? (!) NO   Diet: Regular (no restrictions)   How many servings of fruit and vegetables per day? (!) 0-1   How many sweetened beverages each day? 0-1            10/10/2024   Exercise   Days per week of moderate/strenous exercise 2 days      (!) EXERCISE CONCERN      10/10/2024   Social Factors   Frequency of gathering with friends or relatives Once a week   Worry food won't last until get money to buy more No   Food not last or not have enough money for food? No   Do you have housing? (Housing is defined as stable permanent housing and does not include staying ouside in a car, in a tent, in an abandoned building, in an overnight shelter, or couch-surfing.) Yes   Are you worried about losing your housing? No   Lack of transportation? No   Unable to get utilities (heat,electricity)? No            10/10/2024   Dental   Dentist two times every year? Yes            10/10/2024   TB Screening   Were you born outside of the [place]? No            Today's PHQ-2 Score:       10/10/2024     7:35 AM   PHQ-2 ( 1999 [employer])   Q1: Little interest or pleasure in doing things 0   Q2: Feeling down, depressed or hopeless 0   PHQ-2 Score 0   Q1: Little interest or pleasure in doing things Not at all   Q2: Feeling down,  depressed or hopeless Not at all   PHQ-2 Score 0           10/10/2024   Substance Use   Alcohol more than 3/day or more than 7/wk No   Do you use any other substances recreationally? No        Social History     Tobacco Use    Smoking status: Never    Smokeless tobacco: Never   Vaping Use    Vaping status: Never Used   Substance Use Topics    Alcohol use: Not Currently    Drug use: Not Currently     Types: Cocaine, Marijuana, LSD, MDMA (Ecstasy), Psilocybin     Comment: Have tried when younger 1-2x, no continued use          Mammogram Screening - Patient under 40 years of age: Routine Mammogram Screening not recommended.         10/10/2024   STI Screening   New sexual partner(s) since last STI/HIV test? No        History of abnormal Pap smear: No - age 30- 64 PAP with HPV every 5 years recommended        Latest Ref Rng & Units 2023     9:06 AM   PAP / HPV   PAP  Negative for Intraepithelial Lesion or Malignancy (NILM)    HPV 16 DNA Negative Negative    HPV 18 DNA Negative Negative    Other HR HPV Negative Negative            10/10/2024   Contraception/Family Planning   Questions about contraception or family planning No           Reviewed and updated as needed this visit by Provider                    History reviewed. No pertinent past medical history.  Past Surgical History:   Procedure Laterality Date     SECTION Right 2020    with tubal ligation    GYN SURGERY       OB History    Para Term  AB Living   2 0 0 0 0 2   SAB IAB Ectopic Multiple Live Births   0 0 0 0 0      # Outcome Date GA Lbr Bob/2nd Weight Sex Type Anes PTL Lv   2             1               BP Readings from Last 3 Encounters:   10/10/24 98/58   23 100/66   23 102/68    Wt Readings from Last 3 Encounters:   10/10/24 56.9 kg (125 lb 6.4 oz)   23 55.8 kg (123 lb)   23 55.3 kg (122 lb)                  Patient Active Problem List   Diagnosis   (none) - all problems resolved or  "deleted     Past Surgical History:   Procedure Laterality Date     SECTION Right 2020    with tubal ligation    GYN SURGERY         Social History     Tobacco Use    Smoking status: Never    Smokeless tobacco: Never   Substance Use Topics    Alcohol use: Not Currently     Family History   Problem Relation Age of Onset    Diabetes Father         Diabetes men on fathers side    Substance Abuse Father     Diabetes Paternal Grandfather          Current Outpatient Medications   Medication Sig Dispense Refill    azelastine (ASTELIN) 0.1 % nasal spray Spray 2 sprays into both nostrils 2 times daily 30 mL 0    etonogestrel-ethinyl estradiol (NUVARING) 0.12-0.015 MG/24HR vaginal ring Place 1 each vaginally every 28 days. 3 each 3    Multiple Vitamins-Minerals (IMMUNE SUPPORT PO) Take by mouth.      tretinoin (RETIN-A) 0.025 % external cream        No Known Allergies      Review of Systems  Constitutional, neuro, ENT, endocrine, pulmonary, cardiac, gastrointestinal, genitourinary, musculoskeletal, integument and psychiatric systems are negative, except as otherwise noted.     Objective    Exam  BP 98/58 (BP Location: Right arm, Patient Position: Sitting, Cuff Size: Adult Large)   Pulse 79   Temp 98.3  F (36.8  C) (Tympanic)   Resp 20   Ht 1.575 m (5' 2\")   Wt 56.9 kg (125 lb 6.4 oz)   LMP  (LMP Unknown)   SpO2 99%   BMI 22.94 kg/m     Estimated body mass index is 22.94 kg/m  as calculated from the following:    Height as of this encounter: 1.575 m (5' 2\").    Weight as of this encounter: 56.9 kg (125 lb 6.4 oz).    Physical Exam  GENERAL: alert and no distress  EYES: Eyes grossly normal to inspection, PERRL and conjunctivae and sclerae normal  HENT: ear canals and TM's normal, nose and mouth without ulcers or lesions  NECK: no adenopathy, no asymmetry, masses, or scars  RESP: lungs clear to auscultation - no rales, rhonchi or wheezes  CV: regular rate and rhythm, normal S1 S2, no S3 or S4, no murmur, " click or rub, no peripheral edema  ABDOMEN: soft, nontender, no hepatosplenomegaly, no masses and bowel sounds normal  MS: no gross musculoskeletal defects noted, no edema  SKIN: no suspicious lesions or rashes  NEURO: Normal strength and tone, mentation intact and speech normal  PSYCH: mentation appears normal, affect normal/bright        Signed Electronically by: LIV Ventura CNP

## 2025-04-03 ENCOUNTER — ANCILLARY PROCEDURE (OUTPATIENT)
Dept: GENERAL RADIOLOGY | Facility: CLINIC | Age: 35
End: 2025-04-03
Attending: NURSE PRACTITIONER
Payer: COMMERCIAL

## 2025-04-03 ENCOUNTER — OFFICE VISIT (OUTPATIENT)
Dept: FAMILY MEDICINE | Facility: CLINIC | Age: 35
End: 2025-04-03
Payer: COMMERCIAL

## 2025-04-03 VITALS
HEIGHT: 62 IN | RESPIRATION RATE: 14 BRPM | SYSTOLIC BLOOD PRESSURE: 99 MMHG | HEART RATE: 64 BPM | TEMPERATURE: 97.3 F | BODY MASS INDEX: 22.08 KG/M2 | DIASTOLIC BLOOD PRESSURE: 65 MMHG | WEIGHT: 120 LBS | OXYGEN SATURATION: 99 %

## 2025-04-03 DIAGNOSIS — M25.511 ACUTE PAIN OF RIGHT SHOULDER: Primary | ICD-10-CM

## 2025-04-03 DIAGNOSIS — R09.81 CONGESTION OF PARANASAL SINUS: ICD-10-CM

## 2025-04-03 DIAGNOSIS — M25.511 ACUTE PAIN OF RIGHT SHOULDER: ICD-10-CM

## 2025-04-03 RX ORDER — AZELASTINE 1 MG/ML
2 SPRAY, METERED NASAL 2 TIMES DAILY
Qty: 30 ML | Refills: 2 | Status: SHIPPED | OUTPATIENT
Start: 2025-04-03

## 2025-04-03 ASSESSMENT — PAIN SCALES - GENERAL: PAINLEVEL_OUTOF10: MILD PAIN (1)

## 2025-04-03 NOTE — PROGRESS NOTES
Assessment & Plan     Congestion of paranasal sinus  Continue OTC zyrtec and flonase  Refilled   - azelastine (ASTELIN) 0.1 % nasal spray; Spray 2 sprays into both nostrils 2 times daily.    Acute pain of right shoulder  Rest, Ice,  ES tylenol up to 3500 mg daily  Topical voltaren gel  Film today   - XR Shoulder Right G/E 3 Views; Future  Begin   - Physical Therapy  Referral; Future      FOLLOW UP  6 weeks    Call or return to the clinic with any worsening of symptoms or no resolution. Patient/Parent verbalized understanding and is in agreement. Medication side effects reviewed.   Current Outpatient Medications   Medication Sig Dispense Refill    azelastine (ASTELIN) 0.1 % nasal spray Spray 2 sprays into both nostrils 2 times daily. 30 mL 2    Multiple Vitamins-Minerals (IMMUNE SUPPORT PO) Take by mouth.      tretinoin (RETIN-A) 0.025 % external cream        Chart documentation with Dragon Voice recognition Software. Although reviewed after completion, some words and grammatical errors may remain.  Josi Ramos MSN,FNP-BC  [address] [address]  [phone]                Ann eBrger is a 34 year old, presenting for the following health issues:  Shoulder        4/3/2025     7:44 AM   Additional Questions   Roomed by Estefania     History of Present Illness       Reason for visit:  Shoulder  Symptom onset:  More than a month  Symptoms include:  Throb, dull pain in right shoulder when elevated pushing pulling  Symptom intensity:  Mild  Symptom progression:  Staying the same  Had these symptoms before:  No   She is taking medications regularly.        Right shoulder pain since Jan 2025   Right handed no trauma   Hard to sleep on that side  Not able to keep arm above her head  No interventions yet  Spring and fall allergies   Uses nasal spray helps.   OTC generic zyrtec helps  Flonase nasal spray over the counter helpful            Review of  "Systems  Constitutional, neuro, ENT, endocrine, pulmonary, cardiac, gastrointestinal, genitourinary, musculoskeletal, integument and psychiatric systems are negative, except as otherwise noted.      Objective    BP 99/65   Pulse 64   Temp 97.3  F (36.3  C) (Tympanic)   Resp 14   Ht 1.581 m (5' 2.25\")   Wt 54.4 kg (120 lb)   LMP 03/27/2025   SpO2 99%   BMI 21.77 kg/m    Body mass index is 21.77 kg/m .  Physical Exam   GENERAL: alert and no distress  EYES: Eyes grossly normal to inspection, PERRL and conjunctivae and sclerae normal  HENT: ear canals and TM's normal, nose and mouth without ulcers or lesions  NECK: no adenopathy, no asymmetry, masses, or scars  RESP: lungs clear to auscultation - no rales, rhonchi or wheezes  CV: regular rate and rhythm, normal S1 S2, no S3 or S4, no murmur, click or rub, no peripheral edema  ABDOMEN: soft, nontender, no hepatosplenomegaly, no masses and bowel sounds normal  MS: decreased range of motion right shoulder , peripheral pulses normal, and tenderness to palpation right posterior shoulder   SKIN: no suspicious lesions or rashes  NEURO: Normal strength and tone, mentation intact and speech normal  PSYCH: mentation appears normal, affect normal/bright            Signed Electronically by: LIV Ventura CNP    "

## 2025-04-03 NOTE — PATIENT INSTRUCTIONS
Thank you for coming to see me today! Here are a couple of pieces of information about messages and lab communication practices.     If lab work was done today as part of your evaluation you will generally be contacted via [employer], mail, or phone with the results within 7-10 days.  If there is an alarming/concerning result we will contact you by phone. Lab results come back at varying times, I generally wait until all labs are resulted before making comments on results. Please note, labs are automatically released to [employer] once available, but it may take a couple of days for my interpretation note to appear.     I try very hard to respond to medical messages with 2 business days of receiving them. Occasionally it takes me longer if I am trying to figure out the best way to respond and need to seek guidance, do some research or dig deeper into your medical history to come up with a helpful response.     If you need refills please contact your pharmacist. They will send a refill request to me to review. Please allow 3-5 business days for us to respond to all refill requests.     Please call or send a medical message with any questions or concerns. Thank you for trusting me to be part of your healthcare team!

## 2025-04-16 ASSESSMENT — ACTIVITIES OF DAILY LIVING (ADL)
PUSHING_WITH_THE_INVOLVED_ARM: 1
TOUCHING_THE_BACK_OF_YOUR_NECK: 0
WASHING_YOUR_HAIR?: 0
PUTTING_ON_A_SHIRT_THAT_BUTTONS_DOWN_THE_FRONT: 0
REMOVING_SOMETHING_FROM_YOUR_BACK_POCKET: 0
WHEN_LYING_ON_THE_INVOLVED_SIDE: 4
PUTTING_ON_YOUR_PANTS: 0
AT_ITS_WORST?: 5
WASHING_YOUR_BACK: 1
PLEASE_INDICATE_YOR_PRIMARY_REASON_FOR_REFERRAL_TO_THERAPY:: SHOULDER
PLACING_AN_OBJECT_ON_A_HIGH_SHELF: 2
REACHING_FOR_SOMETHING_ON_A_HIGH_SHELF: 3
PUTTING_ON_AN_UNDERSHIRT_OR_A_PULLOVER_SWEATER: 0

## 2025-04-17 ENCOUNTER — THERAPY VISIT (OUTPATIENT)
Dept: PHYSICAL THERAPY | Facility: CLINIC | Age: 35
End: 2025-04-17
Attending: NURSE PRACTITIONER
Payer: COMMERCIAL

## 2025-04-17 DIAGNOSIS — M25.511 ACUTE PAIN OF RIGHT SHOULDER: ICD-10-CM

## 2025-04-17 PROCEDURE — 97140 MANUAL THERAPY 1/> REGIONS: CPT | Mod: GP | Performed by: PHYSICAL THERAPIST

## 2025-04-17 PROCEDURE — 97110 THERAPEUTIC EXERCISES: CPT | Mod: GP | Performed by: PHYSICAL THERAPIST

## 2025-04-17 PROCEDURE — 97161 PT EVAL LOW COMPLEX 20 MIN: CPT | Mod: GP | Performed by: PHYSICAL THERAPIST

## 2025-04-17 NOTE — PROGRESS NOTES
PHYSICAL THERAPY EVALUATION  Type of Visit: Evaluation       Fall Risk Screen:  Have you fallen 2 or more times in the past year?: No  Have you fallen and had an injury in the past year?: No    Subjective         Presenting condition or subjective complaint: Shoulder pain since january, more pain while arm is elevated and pulling/ pushing or sleeping on side    Pt presents with R shoulder pain which started in January of this year. Pain is located along lateral shoulder. Feels tight along shoulder/neck. Denies numbness/tingling. Aggravating factors are push/pull activities (vacuum, mopping), reaching overhead and sleeping occasionally. Still doing mobility in the morning. Nothing relieves it.     Date of onset: 01/01/25    Relevant medical history:     Dates & types of surgery:      Prior diagnostic imaging/testing results: X-ray     Prior therapy history for the same diagnosis, illness or injury: No      Prior Level of Function  Independent     Living Environment  Social support: With family members   Type of home: House   Stairs to enter the home: Yes 5 Is there a railing: Yes     Ramp: No   Stairs inside the home: Yes 20 Is there a railing: Yes     Help at home: None  Equipment owned:       Employment: Yes / [employer]  Hobbies/Interests: Paddle boarding, yoga, walking/ biking    Patient goals for therapy:      Pain assessment: Pain present     Objective   SHOULDER EVALUATION  PAIN: Pain Level at Rest: 0/10  Pain Level with Use: 5/10  Pain is Exacerbated By: sleeping, household chores   INTEGUMENTARY (edema, incisions): WFL  POSTURE: Sitting Posture: Rounded shoulders, Forward head  GAIT:     ROM:   (Degrees) Left AROM Left PROM Right AROM  Right PROM   Shoulder Flexion full  155 Full, pain at end range   Shoulder Abduction full  155 Full, pain at end range   Shoulder External Rotation full  65 ER at 90: excessive range   Shoulder Internal Rotation T6  T6 ER at 90: 50          Shoulder  Flexion ER       Shoulder Flexion IR       Elbow Extension       Elbow Flexion       Pain:   End feel:     STRENGTH:   Pain: - none + mild ++ moderate +++ severe  Strength Scale: 0-5/5 Left Right   Shoulder Flexion 5 5-, pain   Shoulder Abduction 5 3, pain   Shoulder Adduction 5 5   Shoulder External Rotation 5 5-   Shoulder Internal Rotation 5 5   Shoulder Horizontal Adduction     Elbow Flexion 5 5   Elbow Extension 5 5   Periscapular Strength:     Mid Trap 5- 5-   Lower Trap 3 3, pain   Rhomboid 5 5   Serratus Anterior         SPECIAL TESTS:    Left Right   Impingement     Neer's  +   Hawkin's-Gokul  +   Coracoid Impingement     Internal impingement     Labral     Anterior Slide     Iosco's     Crank  -   Instability     Apprehension (anterior)  -   Relocation (anterior)  -   Anterior Load & Shift     Posterior Load & Shift     Posterior instability (with 90 degrees flex)     Multi-Directional Instability      Sulcus     Biceps      Speed's  +   Rotator Cuff Tear     Drop Arm  -   Belly Press     Lift off   -     PALPATION: no pain and tendern  JOINT MOBILITY: hypomobile post and inf GHJ  CERVICAL SCREEN: WFL      Assessment & Plan   CLINICAL IMPRESSIONS  Medical Diagnosis: Acute pain of right shoulder (M25.511)    Treatment Diagnosis: R shoulder pain   Impression/Assessment: Patient is a 35 year old female with R shoulder impingement complaints.  The following significant findings have been identified: Pain, Decreased ROM/flexibility, Decreased joint mobility, Decreased strength, Impaired muscle performance, Decreased activity tolerance, and Impaired posture. These impairments interfere with their ability to perform self care tasks, recreational activities, household chores, driving , and sleeping  as compared to previous level of function.     Clinical Decision Making (Complexity):  Clinical Presentation: Stable/Uncomplicated  Clinical Presentation Rationale: based on medical and personal factors listed in  PT evaluation  Clinical Decision Making (Complexity): Low complexity    PLAN OF CARE  Treatment Interventions:  Modalities: Cupping, Dry Needling, E-stim  Interventions: Manual Therapy, Neuromuscular Re-education, Therapeutic Activity, Therapeutic Exercise, Self-Care/Home Management    Long Term Goals     PT Goal 1  Goal Identifier: LTG  Goal Description: Pt will be independent in HEP in order to self manage symptoms  Target Date: 05/29/25  PT Goal 2  Goal Identifier: LTG  Goal Description: Pt will demonstrate full shoulder ROM without pain in order to reach overhead to grab object  Target Date: 05/29/25  PT Goal 3  Goal Identifier: LTG  Goal Description: Pt will demonstrate 5/5 RC strength in order to improve scap rhythm with push/pull household chore activities  Target Date: 05/29/25  PT Goal 4  Goal Identifier: STG  Goal Description: Pt will demonstrate proper posture and body mechanics in order to reduce strain while seated at work  Target Date: 05/08/25      Frequency of Treatment: 1x week  Duration of Treatment: 6 weeks    Recommended Referrals to Other Professionals:   Education Assessment:   Learner/Method: Patient;Listening;Demonstration;Pictures/Video;Reading    Risks and benefits of evaluation/treatment have been explained.   Patient/Family/caregiver agrees with Plan of Care.     Evaluation Time:     PT Eval, Low Complexity Minutes (47651): 17       Signing Clinician: Christine Garcia, PT

## 2025-04-24 ENCOUNTER — THERAPY VISIT (OUTPATIENT)
Dept: PHYSICAL THERAPY | Facility: CLINIC | Age: 35
End: 2025-04-24
Attending: NURSE PRACTITIONER
Payer: COMMERCIAL

## 2025-04-24 DIAGNOSIS — M25.511 ACUTE PAIN OF RIGHT SHOULDER: Primary | ICD-10-CM

## 2025-04-24 PROCEDURE — 97110 THERAPEUTIC EXERCISES: CPT | Mod: GP | Performed by: PHYSICAL THERAPIST

## 2025-04-24 PROCEDURE — 97140 MANUAL THERAPY 1/> REGIONS: CPT | Mod: GP | Performed by: PHYSICAL THERAPIST

## 2025-05-01 ENCOUNTER — THERAPY VISIT (OUTPATIENT)
Dept: PHYSICAL THERAPY | Facility: CLINIC | Age: 35
End: 2025-05-01
Attending: NURSE PRACTITIONER
Payer: COMMERCIAL

## 2025-05-01 DIAGNOSIS — M25.511 ACUTE PAIN OF RIGHT SHOULDER: Primary | ICD-10-CM

## 2025-05-01 PROCEDURE — 97110 THERAPEUTIC EXERCISES: CPT | Mod: GP | Performed by: PHYSICAL THERAPIST

## 2025-05-01 PROCEDURE — 97140 MANUAL THERAPY 1/> REGIONS: CPT | Mod: GP | Performed by: PHYSICAL THERAPIST

## 2025-05-15 ENCOUNTER — THERAPY VISIT (OUTPATIENT)
Dept: PHYSICAL THERAPY | Facility: CLINIC | Age: 35
End: 2025-05-15
Attending: NURSE PRACTITIONER
Payer: COMMERCIAL

## 2025-05-15 DIAGNOSIS — M25.511 ACUTE PAIN OF RIGHT SHOULDER: Primary | ICD-10-CM

## 2025-05-15 PROCEDURE — 97110 THERAPEUTIC EXERCISES: CPT | Mod: GP | Performed by: PHYSICAL THERAPIST

## 2025-05-15 PROCEDURE — 97112 NEUROMUSCULAR REEDUCATION: CPT | Mod: GP | Performed by: PHYSICAL THERAPIST

## 2025-05-15 PROCEDURE — 97140 MANUAL THERAPY 1/> REGIONS: CPT | Mod: GP | Performed by: PHYSICAL THERAPIST

## 2025-05-22 ENCOUNTER — THERAPY VISIT (OUTPATIENT)
Dept: PHYSICAL THERAPY | Facility: CLINIC | Age: 35
End: 2025-05-22
Attending: NURSE PRACTITIONER
Payer: COMMERCIAL

## 2025-05-22 DIAGNOSIS — M25.511 ACUTE PAIN OF RIGHT SHOULDER: Primary | ICD-10-CM

## 2025-05-22 PROCEDURE — 97139 UNLISTED THERAPEUTIC PX: CPT | Mod: GP | Performed by: PHYSICAL THERAPIST

## 2025-05-22 PROCEDURE — 97110 THERAPEUTIC EXERCISES: CPT | Mod: GP | Performed by: PHYSICAL THERAPIST

## 2025-05-22 NOTE — PROGRESS NOTES
PLAN  Continue therapy per current plan of care.    Beginning/End Dates of Progress Note Reporting Period:  04/17/25 to 05/22/2025    Referring Provider:  Josi Ramos       05/22/25 0500   Appointment Info   Signing clinician's name / credentials Christine Garcia, PT, DPT   Visits Used 5   Medical Diagnosis Acute pain of right shoulder (M25.511)   PT Tx Diagnosis R shoulder pain   Progress Note/Certification   Onset of illness/injury or Date of Surgery 01/01/25   Therapy Frequency 1x week   Predicted Duration 6 weeks   Progress Note Due Date 05/29/25   Progress Note Completed Date 04/17/25   GOALS   PT Goals 2;3;4   PT Goal 1   Goal Identifier LTG   Goal Description Pt will be independent in HEP in order to self manage symptoms   Goal Progress compliance   Target Date 05/29/25   PT Goal 2   Goal Identifier LTG   Goal Description Pt will demonstrate full shoulder ROM without pain in order to reach overhead to grab object   Goal Progress see obj   Target Date 05/29/25   PT Goal 3   Goal Identifier LTG   Goal Description Pt will demonstrate 5/5 RC strength in order to improve scap rhythm with push/pull household chore activities   Goal Progress addressing with HEP   Target Date 05/29/25   PT Goal 4   Goal Identifier STG   Goal Description Pt will demonstrate proper posture and body mechanics in order to reduce strain while seated at work   Goal Progress improving mechanics but rib cage flare still present   Target Date 05/08/25   Subjective Report   Subjective Report Tried some yoga, able to power through but it hurts with abd holds and planking position. Not throbbing while sleeping   Objective Measures   Objective Measures Objective Measure 1;Objective Measure 2;Objective Measure 3;Objective Measure 4   Objective Measure 1   Objective Measure AROM   Details Flex: 145* after cupping 150 deg Abd: 125* deg after cupping 130 deg   Objective Measure 2   Objective Measure MMT of R shoulder   Objective Measure  3   Objective Measure Special Tests   Objective Measure 4   Objective Measure Periscapular MMT   Details Rhomboid 5/5 B. Middle trap: 5/5 B. Lower trap: 4/5 L, 3/5 R   PT Modalities   PT Modalities Cupping   Cupping   Cupping Minutes (24018) 25   Treatment Detail Dynamic cupping to  scar. Dynamic cupping to R upper trap, levator scap, infraspinatus, GHJ line, supraspinatus insertion. Static cupping to infraspinatus insertion with cross body stretch 2x30 sec   Patient Response/Progress improve ROM following   Treatment Interventions (PT)   Interventions Therapeutic Procedure/Exercise;Manual Therapy;Neuromuscular Re-education   Therapeutic Procedure/Exercise   Therapeutic Procedures: strength, endurance, ROM, flexibility minutes (82424) 15   Therapeutic Procedures Ther Proc 2   Ther Proc 1 subj taken and obj measures. Edu on performing myofascial release to  incision   Skilled Intervention Increase ROM and strength. Cueing on dosage and proper form   Patient Response/Progress demonstrated understanding   Eval/Assessments   Assessments   (Performed dynamic cupping to R shoulder complex and  scar to assist in fascial release. Following cupping, pt did improve in R shoulder overhead ROM. Updated POC for another 6 weeks)   Education   Learner/Method Patient;Listening;Demonstration;Pictures/Video;Reading   Plan   Home program videos   Plan for next session cupping? MT, thoracic mobility- rotation. SA and periscapular strengthening   Total Session Time   Timed Code Treatment Minutes 40   Total Treatment Time (sum of timed and untimed services) 40

## 2025-05-29 ENCOUNTER — THERAPY VISIT (OUTPATIENT)
Dept: PHYSICAL THERAPY | Facility: CLINIC | Age: 35
End: 2025-05-29
Attending: NURSE PRACTITIONER
Payer: COMMERCIAL

## 2025-05-29 DIAGNOSIS — M25.511 ACUTE PAIN OF RIGHT SHOULDER: Primary | ICD-10-CM

## 2025-05-29 PROCEDURE — 97140 MANUAL THERAPY 1/> REGIONS: CPT | Mod: GP | Performed by: PHYSICAL THERAPIST

## 2025-05-29 PROCEDURE — 97110 THERAPEUTIC EXERCISES: CPT | Mod: GP | Performed by: PHYSICAL THERAPIST

## 2025-07-03 ENCOUNTER — THERAPY VISIT (OUTPATIENT)
Dept: PHYSICAL THERAPY | Facility: CLINIC | Age: 35
End: 2025-07-03
Attending: NURSE PRACTITIONER
Payer: COMMERCIAL

## 2025-07-03 DIAGNOSIS — M25.511 ACUTE PAIN OF RIGHT SHOULDER: Primary | ICD-10-CM

## 2025-07-03 PROCEDURE — 97110 THERAPEUTIC EXERCISES: CPT | Mod: GP | Performed by: PHYSICAL THERAPIST

## 2025-07-03 PROCEDURE — 97140 MANUAL THERAPY 1/> REGIONS: CPT | Mod: GP | Performed by: PHYSICAL THERAPIST

## 2025-07-03 NOTE — PROGRESS NOTES
PLAN  Will follow up with patient in 1 month for reassessment     Beginning/End Dates of Progress Note Reporting Period:  05/22/25 to 07/03/2025    Referring Provider:  Josi Ramos       07/03/25 0500   Appointment Info   Signing clinician's name / credentials Christine Garcia, PT, DPT   Visits Used 7   Medical Diagnosis Acute pain of right shoulder (M25.511)   PT Tx Diagnosis R shoulder pain   Progress Note/Certification   Onset of illness/injury or Date of Surgery 01/01/25   Therapy Frequency monthly   Predicted Duration 8 weeks   Progress Note Due Date 07/03/25   Progress Note Completed Date 05/22/25   GOALS   PT Goals 2;3;4   PT Goal 1   Goal Identifier LTG   Goal Description Pt will be independent in HEP in order to self manage symptoms   Goal Progress compliance   Target Date 05/29/25   PT Goal 2   Goal Identifier LTG   Goal Description Pt will demonstrate full shoulder ROM without pain in order to reach overhead to grab object   Goal Progress full following MT   Target Date 05/29/25   PT Goal 3   Goal Identifier LTG   Goal Description Pt will demonstrate 5/5 RC strength in order to improve scap rhythm with push/pull household chore activities   Goal Progress see obj   Target Date 05/29/25   PT Goal 4   Goal Identifier STG   Goal Description Pt will demonstrate proper posture and body mechanics in order to reduce strain while seated at work   Target Date 05/08/25   Date Met 05/29/25   Subjective Report   Subjective Report No shoulder pain but stiffness with overhead reaching   Objective Measures   Objective Measures Objective Measure 1;Objective Measure 2;Objective Measure 3;Objective Measure 4   Objective Measure 1   Objective Measure AROM   Details Flex: 153*  Abd: 135*   Objective Measure 2   Objective Measure MMT of R shoulder   Details Flex: 5/5. ER: 5/5. Abd: 5-/5. pain.   Objective Measure 3   Objective Measure Special Tests   Objective Measure 4   Objective Measure Periscapular MMT    Details Middle trap: 5/5 B. Lower trap: 4/5 L, 3/5 R   Treatment Interventions (PT)   Interventions Therapeutic Procedure/Exercise;Manual Therapy;Neuromuscular Re-education   Therapeutic Procedure/Exercise   Therapeutic Procedures: strength, endurance, ROM, flexibility minutes (50793) 23   Therapeutic Procedures Ther Proc 2   Ther Proc 1 subj taken and obj measures. doorway cross body stretch x30 sec- cues for body position. lat stretch 2x30 sec. wall W to Y x10. wall Ys x10. seated wall angels x10. standing shoulder abd x3   Skilled Intervention Increase ROM and strength. Cueing on dosage and proper form   Patient Response/Progress no increase in pain with exercises, difficulty with activation of lower trap   Manual Therapy   Manual Therapy: Mobilization, MFR, MLD, friction massage minutes (65587) 10   Manual Therapy 1 Post GHJ mobs at 90 deg abd and full IR, grade 3-4   Manual Therapy 2 Inf GHJ mobs at 90 and 125 deg abd, grade 3-4   Manual Therapy 3 TPR to R infra and UT   Skilled Intervention improve ROM   Patient Response/Progress full shoulder abduction following   Eval/Assessments   Assessments   (Increased ROM and strength noted of R shoulder. Following MT, pt had full abd range. Progressed strength to include further scapular stability and thoracic mobility. Will plan to see pt in 4 weeks for recheck of limitations)   Education   Learner/Method Patient;Listening;Demonstration;Pictures/Video;Reading   Plan   Home program videos   Plan for next session recheck shoulder abd and lower trap strength. progress exercises as able   Total Session Time   Timed Code Treatment Minutes 33   Total Treatment Time (sum of timed and untimed services) 33

## 2025-07-31 ENCOUNTER — THERAPY VISIT (OUTPATIENT)
Dept: PHYSICAL THERAPY | Facility: CLINIC | Age: 35
End: 2025-07-31
Attending: NURSE PRACTITIONER
Payer: COMMERCIAL

## 2025-07-31 DIAGNOSIS — M25.511 ACUTE PAIN OF RIGHT SHOULDER: Primary | ICD-10-CM

## 2025-07-31 PROCEDURE — 97110 THERAPEUTIC EXERCISES: CPT | Mod: GP | Performed by: PHYSICAL THERAPIST

## 2025-07-31 PROCEDURE — 97140 MANUAL THERAPY 1/> REGIONS: CPT | Mod: GP | Performed by: PHYSICAL THERAPIST

## 2025-07-31 NOTE — PROGRESS NOTES
DISCHARGE  Reason for Discharge: Patient has met all goals.    Equipment Issued: none     Discharge Plan: Patient to continue home program.    Referring Provider:  Josi Ramos       07/31/25 0500   Appointment Info   Signing clinician's name / credentials Christine Garcia, PT, DPT   Visits Used 8   Medical Diagnosis Acute pain of right shoulder (M25.511)   PT Tx Diagnosis R shoulder pain   Progress Note/Certification   Onset of illness/injury or Date of Surgery 01/01/25   Therapy Frequency monthly   Predicted Duration 8 weeks   Progress Note Due Date 08/28/25   Progress Note Completed Date 07/03/25   GOALS   PT Goals 2;3;4   PT Goal 1   Goal Identifier LTG   Goal Description Pt will be independent in HEP in order to self manage symptoms   Target Date 05/29/25   Date Met 07/31/25   PT Goal 2   Goal Identifier LTG   Goal Description Pt will demonstrate full shoulder ROM without pain in order to reach overhead to grab object   Goal Progress see obj   Target Date 05/29/25   Date Met 07/31/25   PT Goal 3   Goal Identifier LTG   Goal Description Pt will demonstrate 5/5 RC strength in order to improve scap rhythm with push/pull household chore activities   Goal Progress see obj   Target Date 05/29/25   Date Met 07/31/25   PT Goal 4   Goal Identifier STG   Goal Description Pt will demonstrate proper posture and body mechanics in order to reduce strain while seated at work   Target Date 05/08/25   Date Met 05/29/25   Subjective Report   Subjective Report Shoulder has not been bothering her. Tried some paddle boarding and it was fine   Objective Measures   Objective Measures Objective Measure 1;Objective Measure 2;Objective Measure 3;Objective Measure 4   Objective Measure 1   Objective Measure AROM   Details Flex: 153*  Abd: 145*   Objective Measure 2   Objective Measure MMT of R shoulder   Details Abd: 5/5 B   Objective Measure 3   Objective Measure Special Tests   Objective Measure 4   Objective Measure  Periscapular MMT   Details Lower trap: 4/5 L, 3+/5 R   Treatment Interventions (PT)   Interventions Therapeutic Procedure/Exercise;Manual Therapy;Neuromuscular Re-education   Therapeutic Procedure/Exercise   Therapeutic Procedures: strength, endurance, ROM, flexibility minutes ([id number]) 23   Therapeutic Procedures Ther Proc 2   Ther Proc 1 subj taken and obj measures. seated wall angels x10. standing W to Ys x10. standing Ys x10. bicep curl 5lb x10. standing shoulder abd 5 lb x10. overhead press 5 lb x10. waiters carry 5 lb x10. tricep press 5 lb x10   Skilled Intervention Increase strength. Cueing on dosage and proper form   Patient Response/Progress no increase in pain with exercises, difficulty with activation of lower trap   Manual Therapy   Manual Therapy: Mobilization, MFR, MLD, friction massage minutes ([id number]) 8   Manual Therapy 1 Post GHJ mobs at 90 deg abd and full IR, grade 3-4   Skilled Intervention improve ROM   Patient Response/Progress full shoulder abduction following   Eval/Assessments   Assessments   (Pt now has full ROM and strength. She has met 4/4 goals and ready to be discharged to [facility] independently. Encouraged to continue HEP in order for most optimal outcome dx)   Education   Learner/Method Patient;Listening;Demonstration;Pictures/Video;Reading   Plan   Home program videos   Plan for next session discharge   Total Session Time   Timed Code Treatment Minutes 31   Total Treatment Time (sum of timed and untimed services) 31